# Patient Record
Sex: FEMALE | Race: WHITE | Employment: PART TIME | URBAN - METROPOLITAN AREA
[De-identification: names, ages, dates, MRNs, and addresses within clinical notes are randomized per-mention and may not be internally consistent; named-entity substitution may affect disease eponyms.]

---

## 2021-01-19 ENCOUNTER — OFFICE VISIT (OUTPATIENT)
Dept: FAMILY MEDICINE CLINIC | Facility: CLINIC | Age: 26
End: 2021-01-19
Payer: COMMERCIAL

## 2021-01-19 VITALS
HEART RATE: 111 BPM | OXYGEN SATURATION: 98 % | WEIGHT: 133 LBS | DIASTOLIC BLOOD PRESSURE: 70 MMHG | SYSTOLIC BLOOD PRESSURE: 108 MMHG | BODY MASS INDEX: 22.71 KG/M2 | HEIGHT: 64 IN

## 2021-01-19 DIAGNOSIS — Z00.00 ANNUAL PHYSICAL EXAM: Primary | ICD-10-CM

## 2021-01-19 DIAGNOSIS — Z00.00 PREVENTATIVE HEALTH CARE: ICD-10-CM

## 2021-01-19 DIAGNOSIS — L70.9 ACNE, UNSPECIFIED ACNE TYPE: ICD-10-CM

## 2021-01-19 DIAGNOSIS — Z30.09 BIRTH CONTROL COUNSELING: ICD-10-CM

## 2021-01-19 LAB
CONTROL LINE PRESENT WITH A CLEAR BACKGROUND (YES/NO): YES YES/NO
PREGNANCY TEST, URINE: NEGATIVE

## 2021-01-19 PROCEDURE — 81025 URINE PREGNANCY TEST: CPT | Performed by: INTERNAL MEDICINE

## 2021-01-19 PROCEDURE — 99385 PREV VISIT NEW AGE 18-39: CPT | Performed by: INTERNAL MEDICINE

## 2021-01-19 PROCEDURE — 36415 COLL VENOUS BLD VENIPUNCTURE: CPT | Performed by: INTERNAL MEDICINE

## 2021-01-19 PROCEDURE — 3078F DIAST BP <80 MM HG: CPT | Performed by: INTERNAL MEDICINE

## 2021-01-19 PROCEDURE — 3008F BODY MASS INDEX DOCD: CPT | Performed by: INTERNAL MEDICINE

## 2021-01-19 PROCEDURE — 99212 OFFICE O/P EST SF 10 MIN: CPT | Performed by: INTERNAL MEDICINE

## 2021-01-19 PROCEDURE — 3074F SYST BP LT 130 MM HG: CPT | Performed by: INTERNAL MEDICINE

## 2021-01-19 PROCEDURE — 90651 9VHPV VACCINE 2/3 DOSE IM: CPT | Performed by: INTERNAL MEDICINE

## 2021-01-19 PROCEDURE — 90471 IMMUNIZATION ADMIN: CPT | Performed by: INTERNAL MEDICINE

## 2021-01-19 RX ORDER — NORGESTIMATE AND ETHINYL ESTRADIOL 7DAYSX3 28
1 KIT ORAL DAILY
COMMUNITY
End: 2021-02-15

## 2021-01-19 RX ORDER — SPIRONOLACTONE 100 MG/1
100 TABLET, FILM COATED ORAL DAILY
Qty: 90 TABLET | Refills: 0 | Status: SHIPPED | OUTPATIENT
Start: 2021-01-19 | End: 2021-04-19

## 2021-01-19 RX ORDER — DROSPIRENONE AND ETHINYL ESTRADIOL 0.02-3(28)
1 KIT ORAL DAILY
Qty: 1 PACKAGE | Refills: 3 | Status: SHIPPED | OUTPATIENT
Start: 2021-01-19 | End: 2021-02-15 | Stop reason: HOSPADM

## 2021-01-19 RX ORDER — SPIRONOLACTONE 100 MG/1
TABLET, FILM COATED ORAL
COMMUNITY
Start: 2020-06-16 | End: 2021-01-19

## 2021-01-19 NOTE — PROGRESS NOTES
[facility]  New Patient History and Physical      HPI:   Patient presents with:  Medication Request  Contraception      Sari Valverde is a 22year old female presenting for: physical and birth control counseling.     Has  has no past Relation Age of Onset   • Psychiatric Father    • Psychiatric Mother    • Other (Other) Mother         fibro   • Psychiatric Sister    • Psychiatric Brother           REVIEW OF SYSTEMS:   Review of Systems   Constitutional: Negative for chills, fatigue, fe is oriented to person, place, and time. She appears well-developed and well-nourished. No distress. HENT:   Head: Normocephalic and atraumatic. Mouth/Throat: Oropharynx is clear and moist.   Eyes: Pupils are equal, round, and reactive to light.  Conjunc Health Maintenance:  Annual Physical 1/19/2022  Annual Depression Screen due on 08/17/2007  Pap Smear,3 Years due on 6/1/2021  Influenza Vaccine(1) due on 10/01/2020  HPV Vaccines(3 - 3-dose series) due on 10/16/2020  Pneumococcal Vaccine: as well as any side effects or complications from the treatments . Red flags/ ER precautions discussed.       Mer Harris MD  Internal Medicine/Primary Care  [place] [address]

## 2021-01-20 LAB
ABSOLUTE BASOPHILS: 32 CELLS/UL (ref 0–200)
ABSOLUTE EOSINOPHILS: 8 CELLS/UL (ref 15–500)
ABSOLUTE LYMPHOCYTES: 2520 CELLS/UL (ref 850–3900)
ABSOLUTE MONOCYTES: 482 CELLS/UL (ref 200–950)
ABSOLUTE NEUTROPHILS: 4859 CELLS/UL (ref 1500–7800)
ALBUMIN/GLOBULIN RATIO: 1.8 (CALC) (ref 1–2.5)
ALBUMIN: 4.7 G/DL (ref 3.6–5.1)
ALKALINE PHOSPHATASE: 63 U/L (ref 31–125)
ALT: 10 U/L (ref 6–29)
AST: 15 U/L (ref 10–30)
BASOPHILS: 0.4 %
BILIRUBIN, TOTAL: 0.4 MG/DL (ref 0.2–1.2)
BUN: 9 MG/DL (ref 7–25)
CALCIUM: 9.6 MG/DL (ref 8.6–10.2)
CARBON DIOXIDE: 22 MMOL/L (ref 20–32)
CHLORIDE: 105 MMOL/L (ref 98–110)
CREATININE: 0.8 MG/DL (ref 0.5–1.1)
EGFR IF AFRICN AM: 119 ML/MIN/1.73M2
EGFR IF NONAFRICN AM: 102 ML/MIN/1.73M2
EOSINOPHILS: 0.1 %
GLOBULIN: 2.6 G/DL (CALC) (ref 1.9–3.7)
GLUCOSE: 90 MG/DL (ref 65–99)
HEMATOCRIT: 40.2 % (ref 35–45)
HEMOGLOBIN: 13.7 G/DL (ref 11.7–15.5)
LYMPHOCYTES: 31.9 %
MCH: 30 PG (ref 27–33)
MCHC: 34.1 G/DL (ref 32–36)
MCV: 88.2 FL (ref 80–100)
MONOCYTES: 6.1 %
MPV: 10.7 FL (ref 7.5–12.5)
NEUTROPHILS: 61.5 %
PLATELET COUNT: 257 THOUSAND/UL (ref 140–400)
POTASSIUM: 4 MMOL/L (ref 3.5–5.3)
PROTEIN, TOTAL: 7.3 G/DL (ref 6.1–8.1)
RDW: 12.7 % (ref 11–15)
RED BLOOD CELL COUNT: 4.56 MILLION/UL (ref 3.8–5.1)
SODIUM: 137 MMOL/L (ref 135–146)
WHITE BLOOD CELL COUNT: 7.9 THOUSAND/UL (ref 3.8–10.8)

## 2021-01-22 ENCOUNTER — TELEPHONE (OUTPATIENT)
Dept: FAMILY MEDICINE CLINIC | Facility: CLINIC | Age: 26
End: 2021-01-22

## 2021-01-22 NOTE — TELEPHONE ENCOUNTER
Faxed over medical records request form to [address] [place] [address] Primary care  Confirmation received

## 2021-02-05 ENCOUNTER — PATIENT MESSAGE (OUTPATIENT)
Dept: FAMILY MEDICINE CLINIC | Facility: CLINIC | Age: 26
End: 2021-02-05

## 2021-02-15 RX ORDER — DROSPIRENONE AND ETHINYL ESTRADIOL 0.02-3(28)
1 KIT ORAL DAILY
Qty: 1 PACKAGE | Refills: 2 | Status: SHIPPED | OUTPATIENT
Start: 2021-02-15 | End: 2021-05-13

## 2021-05-13 RX ORDER — DROSPIRENONE AND ETHINYL ESTRADIOL TABLETS 0.02-3(28)
KIT ORAL
Qty: 28 TABLET | Refills: 0 | Status: SHIPPED | OUTPATIENT
Start: 2021-05-13 | End: 2021-06-21

## 2021-06-21 RX ORDER — DROSPIRENONE AND ETHINYL ESTRADIOL 0.02-3(28)
1 KIT ORAL DAILY
Qty: 28 TABLET | Refills: 0 | OUTPATIENT
Start: 2021-06-21

## 2021-06-21 RX ORDER — DROSPIRENONE AND ETHINYL ESTRADIOL 0.02-3(28)
KIT ORAL
Qty: 28 TABLET | Refills: 0 | Status: SHIPPED | OUTPATIENT
Start: 2021-06-21 | End: 2021-07-19

## 2021-06-21 NOTE — TELEPHONE ENCOUNTER
Narcias Johnston from pharmacy called stating that they never received the refill request for pts birthcontrol  If possible to re send it

## 2021-07-19 ENCOUNTER — OFFICE VISIT (OUTPATIENT)
Dept: FAMILY MEDICINE CLINIC | Facility: CLINIC | Age: 26
End: 2021-07-19
Payer: COMMERCIAL

## 2021-07-19 VITALS
DIASTOLIC BLOOD PRESSURE: 70 MMHG | OXYGEN SATURATION: 100 % | SYSTOLIC BLOOD PRESSURE: 110 MMHG | WEIGHT: 133 LBS | HEART RATE: 96 BPM | BODY MASS INDEX: 23 KG/M2

## 2021-07-19 DIAGNOSIS — F32.A DEPRESSION, UNSPECIFIED DEPRESSION TYPE: ICD-10-CM

## 2021-07-19 DIAGNOSIS — Z30.09 BIRTH CONTROL COUNSELING: Primary | ICD-10-CM

## 2021-07-19 PROCEDURE — 3078F DIAST BP <80 MM HG: CPT | Performed by: INTERNAL MEDICINE

## 2021-07-19 PROCEDURE — 99213 OFFICE O/P EST LOW 20 MIN: CPT | Performed by: INTERNAL MEDICINE

## 2021-07-19 PROCEDURE — 3074F SYST BP LT 130 MM HG: CPT | Performed by: INTERNAL MEDICINE

## 2021-07-19 RX ORDER — DROSPIRENONE AND ETHINYL ESTRADIOL 0.02-3(28)
KIT ORAL
Qty: 28 TABLET | Refills: 0 | OUTPATIENT
Start: 2021-07-19

## 2021-07-19 RX ORDER — ESCITALOPRAM OXALATE 10 MG/1
10 TABLET ORAL DAILY
Qty: 90 TABLET | Refills: 0 | Status: SHIPPED | OUTPATIENT
Start: 2021-07-19 | End: 2021-10-08

## 2021-07-19 RX ORDER — DROSPIRENONE AND ETHINYL ESTRADIOL 0.02-3(28)
1 KIT ORAL DAILY
Qty: 28 TABLET | Refills: 12 | Status: SHIPPED | OUTPATIENT
Start: 2021-07-19

## 2021-07-19 RX ORDER — CLINDAMYCIN AND BENZOYL PEROXIDE 10; 50 MG/G; MG/G
GEL TOPICAL 2 TIMES DAILY
COMMUNITY

## 2021-07-19 RX ORDER — SPIRONOLACTONE 100 MG/1
TABLET, FILM COATED ORAL
COMMUNITY
Start: 2021-06-26 | End: 2021-07-27

## 2021-07-21 NOTE — PROGRESS NOTES
[facility]  Return Patient      HPI:   Patient presents with:  Contraception  Anxiety      Mekhi Flowers is a 22year old female presenting for: physical and birth control counseling. Has  has no past medical history on file. 6.1 - 8.1 g/dL    ALBUMIN 4.7 3.6 - 5.1 g/dL    GLOBULIN 2.6 1.9 - 3.7 g/dL (calc)    ALBUMIN/GLOBULIN RATIO 1.8 1.0 - 2.5 (calc)    BILIRUBIN, TOTAL 0.4 0.2 - 1.2 mg/dL    ALKALINE PHOSPHATASE 63 31 - 125 U/L    AST 15 10 - 30 U/L    ALT 10 6 - 29 U/L REVIEW OF SYSTEMS:   Review of Systems   Constitutional: Negative for chills, fatigue, fever and unexpected weight change. HENT: Negative for congestion, ear pain, hearing loss, rhinorrhea and sore throat.     Eyes: Negative for pain, redness and vi Mouth/Throat: Oropharynx is clear and moist.   Eyes: Pupils are equal, round, and reactive to light. Conjunctivae and EOM are normal.   Neck: No thyromegaly present.    Cardiovascular: Normal rate, regular rhythm, S1 normal, S2 normal, normal heart sounds

## 2021-07-27 RX ORDER — SPIRONOLACTONE 100 MG/1
100 TABLET, FILM COATED ORAL DAILY
Qty: 90 TABLET | Refills: 0 | Status: SHIPPED | OUTPATIENT
Start: 2021-07-27 | End: 2021-12-07

## 2021-09-07 ENCOUNTER — PATIENT MESSAGE (OUTPATIENT)
Dept: FAMILY MEDICINE CLINIC | Facility: CLINIC | Age: 26
End: 2021-09-07

## 2021-09-08 NOTE — TELEPHONE ENCOUNTER
Pt called in regards previous message   Pt has a really sharp pain, headaches are bad and medication is not helping  Pt would like to be seen if possible   Please call back and advise

## 2021-09-08 NOTE — TELEPHONE ENCOUNTER
Left voicemail. Offer an appt with Dr. Mague Ortega tomorrow, 9/9. Otherwise, [facility] or  for immediate evaluation.

## 2021-09-20 ENCOUNTER — OFFICE VISIT (OUTPATIENT)
Dept: FAMILY MEDICINE CLINIC | Facility: CLINIC | Age: 26
End: 2021-09-20
Payer: COMMERCIAL

## 2021-09-20 VITALS
OXYGEN SATURATION: 99 % | HEART RATE: 91 BPM | SYSTOLIC BLOOD PRESSURE: 104 MMHG | HEIGHT: 64 IN | WEIGHT: 136 LBS | TEMPERATURE: 98 F | DIASTOLIC BLOOD PRESSURE: 70 MMHG | BODY MASS INDEX: 23.22 KG/M2

## 2021-09-20 DIAGNOSIS — Z11.3 SCREEN FOR STD (SEXUALLY TRANSMITTED DISEASE): ICD-10-CM

## 2021-09-20 DIAGNOSIS — N83.209 CYST OF OVARY, UNSPECIFIED LATERALITY: ICD-10-CM

## 2021-09-20 DIAGNOSIS — Z12.4 PAP SMEAR FOR CERVICAL CANCER SCREENING: Primary | ICD-10-CM

## 2021-09-20 PROCEDURE — 3078F DIAST BP <80 MM HG: CPT | Performed by: INTERNAL MEDICINE

## 2021-09-20 PROCEDURE — 3008F BODY MASS INDEX DOCD: CPT | Performed by: INTERNAL MEDICINE

## 2021-09-20 PROCEDURE — 99214 OFFICE O/P EST MOD 30 MIN: CPT | Performed by: INTERNAL MEDICINE

## 2021-09-20 PROCEDURE — 3074F SYST BP LT 130 MM HG: CPT | Performed by: INTERNAL MEDICINE

## 2021-09-20 RX ORDER — NAPROXEN 500 MG/1
500 TABLET ORAL 2 TIMES DAILY WITH MEALS
Qty: 60 TABLET | Refills: 0 | Status: SHIPPED | OUTPATIENT
Start: 2021-09-20

## 2021-09-20 RX ORDER — SUMATRIPTAN 50 MG/1
50 TABLET, FILM COATED ORAL EVERY 2 HOUR PRN
Qty: 10 TABLET | Refills: 0 | Status: SHIPPED | OUTPATIENT
Start: 2021-09-20

## 2021-09-23 LAB
CHLAMYDIA TRACHOMATIS$RNA, TMA: NOT DETECTED
HPV MRNA E6/E7: NOT DETECTED
NEISSERIA GONORRHOEAE$RNA, TMA: NOT DETECTED
TRICHOMONAS VAGINALIS, QL TMA, PAP VIAL: NOT DETECTED

## 2021-09-23 NOTE — PROGRESS NOTES
[facility]  Return Patient      HPI:   Patient presents with:  Pap  Medication Request: for depression      Vita Samayoa is a 32year old female presenting for: physical and birth control counseling.     Has  has no past medical h mg/dL    PROTEIN, TOTAL 7.3 6.1 - 8.1 g/dL    ALBUMIN 4.7 3.6 - 5.1 g/dL    GLOBULIN 2.6 1.9 - 3.7 g/dL (calc)    ALBUMIN/GLOBULIN RATIO 1.8 1.0 - 2.5 (calc)    BILIRUBIN, TOTAL 0.4 0.2 - 1.2 mg/dL    ALKALINE PHOSPHATASE 63 31 - 125 U/L    AST 15 10 - 30 Use      Smoking status: Never Smoker      Smokeless tobacco: Never Used    Alcohol use: Yes    Drug use: Never       Family History:  Family History   Problem Relation Age of Onset   • Psychiatric Father    • Psychiatric Mother    • Other (Other) Mother kg).   Wt Readings from Last 3 Encounters:  09/20/21 : 136 lb (61.7 kg)  07/19/21 : 133 lb (60.3 kg)  01/19/21 : 133 lb (60.3 kg)      Physical Exam   Vitals reviewed. Constitutional: She is oriented to person, place, and time.  She appears well-developed Health Maintenance:  Annual Physical 1/19/2022  Annual Depression Screen due on 08/17/2007  Pap Smear,3 Years due on 6/1/2021  Influenza Vaccine(1) due on 10/01/2020  HPV Vaccines(3 - 3-dose series) due on 10/16/2020  Pneumococcal Vaccine: Birth to

## 2021-09-27 ENCOUNTER — TELEPHONE (OUTPATIENT)
Dept: FAMILY MEDICINE CLINIC | Facility: CLINIC | Age: 26
End: 2021-09-27

## 2021-09-27 ENCOUNTER — OFFICE VISIT (OUTPATIENT)
Dept: FAMILY MEDICINE CLINIC | Facility: CLINIC | Age: 26
End: 2021-09-27
Payer: COMMERCIAL

## 2021-09-27 VITALS
WEIGHT: 136 LBS | HEART RATE: 87 BPM | TEMPERATURE: 98 F | HEIGHT: 65 IN | DIASTOLIC BLOOD PRESSURE: 91 MMHG | RESPIRATION RATE: 15 BRPM | SYSTOLIC BLOOD PRESSURE: 127 MMHG | BODY MASS INDEX: 22.66 KG/M2 | OXYGEN SATURATION: 97 %

## 2021-09-27 DIAGNOSIS — H57.89 IRRITATION OF BOTH EYES: Primary | ICD-10-CM

## 2021-09-27 PROCEDURE — 3008F BODY MASS INDEX DOCD: CPT | Performed by: NURSE PRACTITIONER

## 2021-09-27 PROCEDURE — 99213 OFFICE O/P EST LOW 20 MIN: CPT | Performed by: NURSE PRACTITIONER

## 2021-09-27 RX ORDER — TOBRAMYCIN 3 MG/ML
SOLUTION/ DROPS OPHTHALMIC
Qty: 1 EACH | Refills: 0 | Status: SHIPPED | OUTPATIENT
Start: 2021-09-27

## 2021-09-27 NOTE — PROGRESS NOTES
CHIEF COMPLAINT:   Patient presents with:  Eye Problem      HPI:   Trena Freeman is a 32year old female who presents with chief complaint of possible pink eye/eye complaint. Symptoms began within the past 24 hours.   Symptoms have been gradually worseni Smoking status: Never Smoker      Smokeless tobacco: Never Used    Alcohol use: Yes    Drug use: Never        REVIEW OF SYSTEMS:   GENERAL: feels well otherwise  SKIN: no rashes  EYES:denies blurred vision or double vision.  See HPI  HENT: denies ear pain, plan.         Requested Prescriptions     Signed Prescriptions Disp Refills   • Tobramycin Sulfate 0.3 % Ophthalmic Solution 1 each 0     Sig: Instill 1-2 gtt in both eye(s) q4h for 5-7 days         Risks, benefits, complications and side effects of meds d

## 2021-09-27 NOTE — TELEPHONE ENCOUNTER
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 Today woke up with green discharge with crusting. Dr. Adolfo Mas fully booked today, offered an appt tomorrow, but won't be until late afternoon. Pt unlikely to make it tomorrow as she works.   Discusse

## 2021-09-27 NOTE — PATIENT INSTRUCTIONS
Conjunctivitis, Allergic    Conjunctivitis is an irritation of the thin membrane covering the eye and the inside of the eyelid. This membrane is called the conjunctiva. The condition is often called pink eye or red eye because the eye looks pink or red. medical care right away if any of these occur:   · Increased eyelid swelling  · New or worsening drainage from the eye  · Increasing redness around the eye  · Facial swelling  Cori last reviewed this educational content on 4/1/2020  © 5648-4014 [facility]

## 2021-09-27 NOTE — TELEPHONE ENCOUNTER
Patient states she woke up with green discharge on both eyes. They are extremely itchy, puffy. No fevers. Patient wants to know if she can be seen today or if there is something doctor recommend for her to do. Please advice.

## 2021-10-08 RX ORDER — ESCITALOPRAM OXALATE 10 MG/1
TABLET ORAL
Qty: 90 TABLET | Refills: 0 | Status: SHIPPED | OUTPATIENT
Start: 2021-10-08 | End: 2021-12-28

## 2021-12-07 RX ORDER — SPIRONOLACTONE 100 MG/1
TABLET, FILM COATED ORAL
Qty: 90 TABLET | Refills: 0 | Status: SHIPPED | OUTPATIENT
Start: 2021-12-07

## 2021-12-28 RX ORDER — ESCITALOPRAM OXALATE 10 MG/1
TABLET ORAL
Qty: 90 TABLET | Refills: 0 | Status: SHIPPED | OUTPATIENT
Start: 2021-12-28

## 2022-03-22 RX ORDER — ESCITALOPRAM OXALATE 10 MG/1
TABLET ORAL
Qty: 90 TABLET | Refills: 0 | OUTPATIENT
Start: 2022-03-22

## 2022-03-24 ENCOUNTER — OFFICE VISIT (OUTPATIENT)
Dept: FAMILY MEDICINE CLINIC | Facility: CLINIC | Age: 27
End: 2022-03-24
Payer: COMMERCIAL

## 2022-03-24 VITALS
DIASTOLIC BLOOD PRESSURE: 64 MMHG | OXYGEN SATURATION: 98 % | HEART RATE: 94 BPM | BODY MASS INDEX: 24 KG/M2 | SYSTOLIC BLOOD PRESSURE: 106 MMHG | WEIGHT: 145 LBS | TEMPERATURE: 98 F

## 2022-03-24 DIAGNOSIS — Z00.00 PREVENTATIVE HEALTH CARE: ICD-10-CM

## 2022-03-24 DIAGNOSIS — R63.5 WEIGHT GAIN: ICD-10-CM

## 2022-03-24 DIAGNOSIS — F32.A DEPRESSION, UNSPECIFIED DEPRESSION TYPE: Primary | ICD-10-CM

## 2022-03-24 PROCEDURE — 3078F DIAST BP <80 MM HG: CPT | Performed by: INTERNAL MEDICINE

## 2022-03-24 PROCEDURE — 99214 OFFICE O/P EST MOD 30 MIN: CPT | Performed by: INTERNAL MEDICINE

## 2022-03-24 PROCEDURE — 3074F SYST BP LT 130 MM HG: CPT | Performed by: INTERNAL MEDICINE

## 2022-03-24 RX ORDER — ESCITALOPRAM OXALATE 20 MG/1
20 TABLET ORAL DAILY
Qty: 90 TABLET | Refills: 0 | Status: SHIPPED | OUTPATIENT
Start: 2022-03-24

## 2022-03-25 LAB
ALBUMIN/GLOBULIN RATIO: 1.6 (CALC) (ref 1–2.5)
ALBUMIN: 4.3 G/DL (ref 3.6–5.1)
ALKALINE PHOSPHATASE: 55 U/L (ref 31–125)
AST: 12 U/L (ref 10–30)
BILIRUBIN, TOTAL: 0.2 MG/DL (ref 0.2–1.2)
BUN: 10 MG/DL (ref 7–25)
CALCIUM: 9.7 MG/DL (ref 8.6–10.2)
CARBON DIOXIDE: 20 MMOL/L (ref 20–32)
CHLORIDE: 108 MMOL/L (ref 98–110)
CREATININE: 0.88 MG/DL (ref 0.5–1.1)
EGFR IF AFRICN AM: 105 ML/MIN/1.73M2
EGFR IF NONAFRICN AM: 91 ML/MIN/1.73M2
GLOBULIN: 2.7 G/DL (CALC) (ref 1.9–3.7)
GLUCOSE: 90 MG/DL (ref 65–99)
HEMATOCRIT: 39.7 % (ref 35–45)
HEMOGLOBIN: 13.8 G/DL (ref 11.7–15.5)
MCH: 30.5 PG (ref 27–33)
MCHC: 34.8 G/DL (ref 32–36)
MCV: 87.6 FL (ref 80–100)
MPV: 10.9 FL (ref 7.5–12.5)
PLATELET COUNT: 261 THOUSAND/UL (ref 140–400)
POTASSIUM: 4.8 MMOL/L (ref 3.5–5.3)
PROTEIN, TOTAL: 7 G/DL (ref 6.1–8.1)
RDW: 12.7 % (ref 11–15)
RED BLOOD CELL COUNT: 4.53 MILLION/UL (ref 3.8–5.1)
SODIUM: 143 MMOL/L (ref 135–146)
TSH W/REFLEX TO FT4: 2.84 MIU/L
WHITE BLOOD CELL COUNT: 5.6 THOUSAND/UL (ref 3.8–10.8)

## 2022-04-12 RX ORDER — SPIRONOLACTONE 100 MG/1
TABLET, FILM COATED ORAL
Qty: 90 TABLET | Refills: 0 | Status: SHIPPED | OUTPATIENT
Start: 2022-04-12

## 2022-04-29 ENCOUNTER — HOSPITAL ENCOUNTER (OUTPATIENT)
Dept: ULTRASOUND IMAGING | Age: 27
Discharge: HOME | End: 2022-04-29
Attending: INTERNAL MEDICINE
Payer: COMMERCIAL

## 2022-04-29 DIAGNOSIS — N83.209 CYST OF OVARY, UNSPECIFIED LATERALITY: ICD-10-CM

## 2022-04-29 PROCEDURE — 76830 TRANSVAGINAL US NON-OB: CPT | Performed by: INTERNAL MEDICINE

## 2022-04-29 PROCEDURE — 76856 US EXAM PELVIC COMPLETE: CPT | Performed by: INTERNAL MEDICINE

## 2022-05-02 RX ORDER — ESCITALOPRAM OXALATE 20 MG/1
20 TABLET ORAL DAILY
Qty: 90 TABLET | Refills: 0 | OUTPATIENT
Start: 2022-05-02

## 2022-06-09 ENCOUNTER — TELEPHONE (OUTPATIENT)
Dept: INTERNAL MEDICINE CLINIC | Facility: CLINIC | Age: 27
End: 2022-06-09

## 2022-06-09 ENCOUNTER — HOSPITAL ENCOUNTER (EMERGENCY)
Facility: HOSPITAL | Age: 27
Discharge: HOME | End: 2022-06-09
Attending: EMERGENCY MEDICINE
Payer: COMMERCIAL

## 2022-06-09 ENCOUNTER — HOSPITAL ENCOUNTER (OUTPATIENT)
Age: 27
Discharge: ANOTHER HOSPITAL | End: 2022-06-09
Payer: COMMERCIAL

## 2022-06-09 ENCOUNTER — APPOINTMENT (OUTPATIENT)
Dept: GENERAL RADIOLOGY | Facility: HOSPITAL | Age: 27
End: 2022-06-09
Attending: EMERGENCY MEDICINE
Payer: COMMERCIAL

## 2022-06-09 VITALS
DIASTOLIC BLOOD PRESSURE: 72 MMHG | BODY MASS INDEX: 22.49 KG/M2 | HEART RATE: 109 BPM | OXYGEN SATURATION: 100 % | TEMPERATURE: 100 F | HEIGHT: 65 IN | RESPIRATION RATE: 16 BRPM | WEIGHT: 135 LBS | SYSTOLIC BLOOD PRESSURE: 131 MMHG

## 2022-06-09 VITALS
RESPIRATION RATE: 18 BRPM | TEMPERATURE: 100 F | BODY MASS INDEX: 22.49 KG/M2 | HEART RATE: 112 BPM | SYSTOLIC BLOOD PRESSURE: 123 MMHG | WEIGHT: 135 LBS | HEIGHT: 65 IN | OXYGEN SATURATION: 100 % | DIASTOLIC BLOOD PRESSURE: 70 MMHG

## 2022-06-09 DIAGNOSIS — R51.9 NEW ONSET HEADACHE: ICD-10-CM

## 2022-06-09 DIAGNOSIS — U07.1 COVID-19: ICD-10-CM

## 2022-06-09 DIAGNOSIS — H53.9 VISUAL DISTURBANCE: ICD-10-CM

## 2022-06-09 DIAGNOSIS — U07.1 COVID-19: Primary | ICD-10-CM

## 2022-06-09 DIAGNOSIS — R41.82 ALTERED MENTAL STATUS, UNSPECIFIED ALTERED MENTAL STATUS TYPE: Primary | ICD-10-CM

## 2022-06-09 LAB — SARS-COV-2 RNA RESP QL NAA+PROBE: DETECTED

## 2022-06-09 PROCEDURE — 99284 EMERGENCY DEPT VISIT MOD MDM: CPT

## 2022-06-09 PROCEDURE — 99215 OFFICE O/P EST HI 40 MIN: CPT | Performed by: NURSE PRACTITIONER

## 2022-06-09 PROCEDURE — 96374 THER/PROPH/DIAG INJ IV PUSH: CPT

## 2022-06-09 PROCEDURE — U0002 COVID-19 LAB TEST NON-CDC: HCPCS | Performed by: NURSE PRACTITIONER

## 2022-06-09 PROCEDURE — 96361 HYDRATE IV INFUSION ADD-ON: CPT

## 2022-06-09 PROCEDURE — 96375 TX/PRO/DX INJ NEW DRUG ADDON: CPT

## 2022-06-09 PROCEDURE — 71045 X-RAY EXAM CHEST 1 VIEW: CPT | Performed by: EMERGENCY MEDICINE

## 2022-06-09 RX ORDER — DEXAMETHASONE SODIUM PHOSPHATE 4 MG/ML
4 VIAL (ML) INJECTION ONCE
Status: COMPLETED | OUTPATIENT
Start: 2022-06-09 | End: 2022-06-09

## 2022-06-09 RX ORDER — KETOROLAC TROMETHAMINE 15 MG/ML
15 INJECTION, SOLUTION INTRAMUSCULAR; INTRAVENOUS ONCE
Status: COMPLETED | OUTPATIENT
Start: 2022-06-09 | End: 2022-06-09

## 2022-06-09 RX ORDER — KETOROLAC TROMETHAMINE 10 MG/1
10 TABLET, FILM COATED ORAL EVERY 6 HOURS PRN
Qty: 15 TABLET | Refills: 0 | Status: SHIPPED | OUTPATIENT
Start: 2022-06-09 | End: 2022-06-16
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
## 2022-06-10 NOTE — TELEPHONE ENCOUNTER
Left vm. Please contact pt and assist in scheduling for a video (MyChart) visit at 10:40am with Dr. Jay Moon. Recently COVID+ so it has to be telemedicine.

## 2022-06-13 ENCOUNTER — TELEMEDICINE (OUTPATIENT)
Dept: INTERNAL MEDICINE CLINIC | Facility: CLINIC | Age: 27
End: 2022-06-13

## 2022-06-13 DIAGNOSIS — U07.1 COVID: ICD-10-CM

## 2022-06-13 DIAGNOSIS — R56.9 SEIZURE (HCC): Primary | ICD-10-CM

## 2022-06-13 PROCEDURE — 99214 OFFICE O/P EST MOD 30 MIN: CPT | Performed by: INTERNAL MEDICINE

## 2022-07-13 RX ORDER — DROSPIRENONE AND ETHINYL ESTRADIOL 0.02-3(28)
1 KIT ORAL DAILY
Qty: 28 TABLET | Refills: 0 | Status: SHIPPED | OUTPATIENT
Start: 2022-07-13

## 2022-07-26 RX ORDER — SPIRONOLACTONE 100 MG/1
TABLET, FILM COATED ORAL
Qty: 90 TABLET | Refills: 0 | Status: SHIPPED | OUTPATIENT
Start: 2022-07-26

## 2022-08-08 ENCOUNTER — TELEPHONE (OUTPATIENT)
Dept: INTERNAL MEDICINE CLINIC | Facility: CLINIC | Age: 27
End: 2022-08-08

## 2022-08-08 ENCOUNTER — OFFICE VISIT (OUTPATIENT)
Dept: INTERNAL MEDICINE CLINIC | Facility: CLINIC | Age: 27
End: 2022-08-08
Payer: COMMERCIAL

## 2022-08-08 VITALS
HEART RATE: 72 BPM | WEIGHT: 135 LBS | BODY MASS INDEX: 22.49 KG/M2 | HEIGHT: 65 IN | SYSTOLIC BLOOD PRESSURE: 124 MMHG | RESPIRATION RATE: 16 BRPM | DIASTOLIC BLOOD PRESSURE: 82 MMHG

## 2022-08-08 DIAGNOSIS — R42 VERTIGO: Primary | ICD-10-CM

## 2022-08-08 PROCEDURE — 3079F DIAST BP 80-89 MM HG: CPT | Performed by: INTERNAL MEDICINE

## 2022-08-08 PROCEDURE — 99214 OFFICE O/P EST MOD 30 MIN: CPT | Performed by: INTERNAL MEDICINE

## 2022-08-08 PROCEDURE — 3008F BODY MASS INDEX DOCD: CPT | Performed by: INTERNAL MEDICINE

## 2022-08-08 PROCEDURE — 3074F SYST BP LT 130 MM HG: CPT | Performed by: INTERNAL MEDICINE

## 2022-08-08 RX ORDER — MECLIZINE HYDROCHLORIDE 50 MG/1
50 TABLET ORAL 3 TIMES DAILY PRN
Qty: 90 TABLET | Refills: 0 | Status: SHIPPED | OUTPATIENT
Start: 2022-08-08
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
## 2022-08-08 NOTE — TELEPHONE ENCOUNTER
Patient is calling in stating she has been experiencing dizziness since yesterday. States she feels intoxicated but has not had any alcohol in over a week. States she is concerned, please call to advise.

## 2022-08-09 ENCOUNTER — TELEPHONE (OUTPATIENT)
Dept: INTERNAL MEDICINE CLINIC | Facility: CLINIC | Age: 27
End: 2022-08-09

## 2022-08-09 NOTE — TELEPHONE ENCOUNTER
Pt called stating that the depression medication that she is currently on is not helping and would like for doctor to change it if possible     Please call back and advise

## 2022-08-16 ENCOUNTER — OFFICE VISIT (OUTPATIENT)
Dept: INTERNAL MEDICINE CLINIC | Facility: CLINIC | Age: 27
End: 2022-08-16
Payer: COMMERCIAL

## 2022-08-16 VITALS
HEART RATE: 85 BPM | SYSTOLIC BLOOD PRESSURE: 96 MMHG | WEIGHT: 134 LBS | BODY MASS INDEX: 22.88 KG/M2 | OXYGEN SATURATION: 99 % | DIASTOLIC BLOOD PRESSURE: 62 MMHG | TEMPERATURE: 97 F | HEIGHT: 64 IN

## 2022-08-16 DIAGNOSIS — F32.A DEPRESSION, UNSPECIFIED DEPRESSION TYPE: Primary | ICD-10-CM

## 2022-08-16 PROCEDURE — 3074F SYST BP LT 130 MM HG: CPT | Performed by: INTERNAL MEDICINE

## 2022-08-16 PROCEDURE — 3078F DIAST BP <80 MM HG: CPT | Performed by: INTERNAL MEDICINE

## 2022-08-16 PROCEDURE — 3008F BODY MASS INDEX DOCD: CPT | Performed by: INTERNAL MEDICINE

## 2022-08-16 PROCEDURE — 99214 OFFICE O/P EST MOD 30 MIN: CPT | Performed by: INTERNAL MEDICINE

## 2022-08-16 RX ORDER — SERTRALINE HYDROCHLORIDE 25 MG/1
25 TABLET, FILM COATED ORAL DAILY
Qty: 30 TABLET | Refills: 1 | Status: SHIPPED | OUTPATIENT
Start: 2022-08-16

## 2022-08-18 ENCOUNTER — TELEPHONE (OUTPATIENT)
Dept: NEUROLOGY | Facility: CLINIC | Age: 27
End: 2022-08-18

## 2022-08-18 ENCOUNTER — OFFICE VISIT (OUTPATIENT)
Dept: NEUROLOGY | Facility: CLINIC | Age: 27
End: 2022-08-18
Payer: COMMERCIAL

## 2022-08-18 VITALS
HEART RATE: 70 BPM | BODY MASS INDEX: 22.88 KG/M2 | DIASTOLIC BLOOD PRESSURE: 82 MMHG | WEIGHT: 134 LBS | SYSTOLIC BLOOD PRESSURE: 124 MMHG | HEIGHT: 64 IN

## 2022-08-18 DIAGNOSIS — G40.909 NONINTRACTABLE EPILEPSY WITHOUT STATUS EPILEPTICUS, UNSPECIFIED EPILEPSY TYPE (HCC): Primary | ICD-10-CM

## 2022-08-18 PROCEDURE — 3079F DIAST BP 80-89 MM HG: CPT | Performed by: OTHER

## 2022-08-18 PROCEDURE — 3074F SYST BP LT 130 MM HG: CPT | Performed by: OTHER

## 2022-08-18 PROCEDURE — 3008F BODY MASS INDEX DOCD: CPT | Performed by: OTHER

## 2022-08-18 PROCEDURE — 99205 OFFICE O/P NEW HI 60 MIN: CPT | Performed by: OTHER

## 2022-08-18 RX ORDER — FOLIC ACID 1 MG/1
TABLET ORAL
Qty: 141 TABLET | Refills: 0 | Status: SHIPPED | OUTPATIENT
Start: 2022-08-18 | End: 2022-12-23

## 2022-08-18 RX ORDER — LEVETIRACETAM 500 MG/1
500 TABLET ORAL 2 TIMES DAILY
Qty: 180 TABLET | Refills: 1 | Status: SHIPPED | OUTPATIENT
Start: 2022-08-18 | End: 2023-02-14

## 2022-08-18 RX ORDER — TOPIRAMATE 50 MG/1
50 TABLET, FILM COATED ORAL 2 TIMES DAILY
Qty: 180 TABLET | Refills: 1 | Status: SHIPPED | OUTPATIENT
Start: 2022-08-18 | End: 2023-02-14

## 2022-08-18 NOTE — TELEPHONE ENCOUNTER
Patient called and said she received (2) prescriptions from her Pharmacy. One is Keppra and one is Topiramate. She wants to make sure she is supposed to   be taking both.

## 2022-08-18 NOTE — TELEPHONE ENCOUNTER
He needs to take Keppra not Topamax. I discontinued the Topamax prescription. I also explain to her that she needs to take keppra not Topamax. I will call her and let her know as well.

## 2022-08-23 ENCOUNTER — TELEPHONE (OUTPATIENT)
Dept: INTERNAL MEDICINE CLINIC | Facility: CLINIC | Age: 27
End: 2022-08-23

## 2022-08-23 RX ORDER — DROSPIRENONE AND ETHINYL ESTRADIOL 0.02-3(28)
1 KIT ORAL DAILY
Qty: 28 TABLET | Refills: 2 | Status: SHIPPED | OUTPATIENT
Start: 2022-08-23 | End: 2022-08-23

## 2022-08-23 RX ORDER — DROSPIRENONE AND ETHINYL ESTRADIOL 0.02-3(28)
1 KIT ORAL DAILY
Qty: 28 TABLET | Refills: 2 | Status: SHIPPED | OUTPATIENT
Start: 2022-08-23

## 2022-08-23 NOTE — TELEPHONE ENCOUNTER
Birthcontrol was authorized to [facility]. Lexapro is what she wants to go back to. Patient is not using sertraline because of possible side effects and prefers to use Lexapro.

## 2022-08-23 NOTE — TELEPHONE ENCOUNTER
Pt is calling stating that pcp gave her new medication but is not taking it because of the side effects and would like to get the previous medication instead. Pt is also calling requesting birthcontrol.        Please call and advise

## 2022-08-24 RX ORDER — ESCITALOPRAM OXALATE 20 MG/1
20 TABLET ORAL DAILY
Qty: 30 TABLET | Refills: 0 | Status: SHIPPED | OUTPATIENT
Start: 2022-08-24

## 2022-09-27 ENCOUNTER — PATIENT MESSAGE (OUTPATIENT)
Dept: NEUROLOGY | Facility: CLINIC | Age: 27
End: 2022-09-27

## 2022-09-27 ENCOUNTER — TELEPHONE (OUTPATIENT)
Dept: INTERNAL MEDICINE CLINIC | Facility: CLINIC | Age: 27
End: 2022-09-27

## 2022-09-27 RX ORDER — ESCITALOPRAM OXALATE 20 MG/1
20 TABLET ORAL DAILY
Qty: 90 TABLET | Refills: 0 | Status: SHIPPED | OUTPATIENT
Start: 2022-09-27

## 2022-09-27 RX ORDER — ESCITALOPRAM OXALATE 20 MG/1
TABLET ORAL
Qty: 30 TABLET | Refills: 0 | OUTPATIENT
Start: 2022-09-27

## 2022-09-30 RX ORDER — ESCITALOPRAM OXALATE 20 MG/1
20 TABLET ORAL DAILY
Qty: 90 TABLET | Refills: 0 | OUTPATIENT
Start: 2022-09-30

## 2022-10-30 ENCOUNTER — OFFICE VISIT (OUTPATIENT)
Dept: FAMILY MEDICINE CLINIC | Facility: CLINIC | Age: 27
End: 2022-10-30
Payer: COMMERCIAL

## 2022-10-30 VITALS
BODY MASS INDEX: 22.49 KG/M2 | HEIGHT: 65 IN | WEIGHT: 135 LBS | SYSTOLIC BLOOD PRESSURE: 124 MMHG | HEART RATE: 91 BPM | DIASTOLIC BLOOD PRESSURE: 78 MMHG | RESPIRATION RATE: 16 BRPM | OXYGEN SATURATION: 98 % | TEMPERATURE: 99 F

## 2022-10-30 DIAGNOSIS — J02.0 STREP THROAT: Primary | ICD-10-CM

## 2022-10-30 LAB
CONTROL LINE PRESENT WITH A CLEAR BACKGROUND (YES/NO): YES YES/NO
KIT LOT #: ABNORMAL NUMERIC
STREP GRP A CUL-SCR: POSITIVE

## 2022-10-30 PROCEDURE — 3008F BODY MASS INDEX DOCD: CPT | Performed by: NURSE PRACTITIONER

## 2022-10-30 PROCEDURE — 87880 STREP A ASSAY W/OPTIC: CPT | Performed by: NURSE PRACTITIONER

## 2022-10-30 PROCEDURE — 3078F DIAST BP <80 MM HG: CPT | Performed by: NURSE PRACTITIONER

## 2022-10-30 PROCEDURE — 99213 OFFICE O/P EST LOW 20 MIN: CPT | Performed by: NURSE PRACTITIONER

## 2022-10-30 PROCEDURE — 3074F SYST BP LT 130 MM HG: CPT | Performed by: NURSE PRACTITIONER

## 2022-10-30 RX ORDER — AMOXICILLIN 500 MG/1
500 CAPSULE ORAL 3 TIMES DAILY
Qty: 30 CAPSULE | Refills: 0 | Status: SHIPPED | OUTPATIENT
Start: 2022-10-30 | End: 2022-11-09

## 2022-10-30 NOTE — PATIENT INSTRUCTIONS
You are considered to be contagious until you have been on antibiotics for 24 hours. You can return to school and/or work once on antibiotics for 24 hours. Can use over the counter Tylenol/Ibuprofen as needed. Push fluids- warm or cool liquids, whichever is soothing for patient  Change tooth brush two days into antibiotic therapy. Warm salt water gargles 2 times per day for at least 3 days. Do not share utensils or drinks with anyone. Follow up in 3-5 days if not improving, condition worsens, or fever greater than or equal to 100.4 persists for 72 hours.     Be sure to finish entire course of antibiotics to reduce risk of reinfection or antibiotic resistance

## 2022-11-01 RX ORDER — DROSPIRENONE AND ETHINYL ESTRADIOL 0.02-3(28)
1 KIT ORAL DAILY
Qty: 28 TABLET | Refills: 2 | Status: SHIPPED | OUTPATIENT
Start: 2022-11-01

## 2022-11-11 RX ORDER — SPIRONOLACTONE 100 MG/1
TABLET, FILM COATED ORAL
Qty: 90 TABLET | Refills: 0 | Status: SHIPPED | OUTPATIENT
Start: 2022-11-11

## 2022-11-17 ENCOUNTER — TELEPHONE (OUTPATIENT)
Dept: INTERNAL MEDICINE CLINIC | Facility: CLINIC | Age: 27
End: 2022-11-17

## 2022-11-17 NOTE — TELEPHONE ENCOUNTER
Patient only describes discomfort from the strep, she did finish the 10 days of antibiotics. She was advised to do home supportive care and to use OTC meds to help with the pain, she verbalized understanding.

## 2022-11-17 NOTE — TELEPHONE ENCOUNTER
Patient is calling stating she was positive with covid on 10/30. Was given an antibiotic for 10 days. She finished the antibiotic about a week ago. She states still feels a moderate sore throat and wants to know if that's normal and wonder if she can still be contagious. 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       Please advice

## 2022-11-25 DIAGNOSIS — G40.909 NONINTRACTABLE EPILEPSY WITHOUT STATUS EPILEPTICUS, UNSPECIFIED EPILEPSY TYPE (HCC): ICD-10-CM

## 2022-11-28 RX ORDER — FOLIC ACID 1 MG/1
TABLET ORAL
Qty: 141 TABLET | Refills: 0 | Status: SHIPPED | OUTPATIENT
Start: 2022-11-28

## 2022-11-28 NOTE — TELEPHONE ENCOUNTER
The patient is requesting a refill on: Folic acid - Take one tablet daily    Last OV: 8/18/22  Next OV: None scheduled  Last refilled: 8/18/22 #141

## 2022-12-29 RX ORDER — ESCITALOPRAM OXALATE 20 MG/1
TABLET ORAL
Qty: 90 TABLET | Refills: 0 | Status: SHIPPED | OUTPATIENT
Start: 2022-12-29

## 2023-01-03 ENCOUNTER — HOSPITAL ENCOUNTER (INPATIENT)
Facility: HOSPITAL | Age: 28
LOS: 1 days | Discharge: HOME | End: 2023-01-04
Attending: EMERGENCY MEDICINE | Admitting: HOSPITALIST
Payer: COMMERCIAL

## 2023-01-03 ENCOUNTER — APPOINTMENT (OUTPATIENT)
Dept: CT IMAGING | Facility: HOSPITAL | Age: 28
End: 2023-01-03
Attending: EMERGENCY MEDICINE
Payer: COMMERCIAL

## 2023-01-03 DIAGNOSIS — R56.9 SEIZURE (HCC): Primary | ICD-10-CM

## 2023-01-03 LAB
ANION GAP SERPL CALC-SCNC: 9 MMOL/L (ref 0–18)
BASOPHILS # BLD AUTO: 0.04 X10(3) UL (ref 0–0.2)
BASOPHILS NFR BLD AUTO: 0.3 %
BUN BLD-MCNC: 15 MG/DL (ref 7–18)
BUN/CREAT SERPL: 15.6 (ref 10–20)
CALCIUM BLD-MCNC: 9 MG/DL (ref 8.5–10.1)
CHLORIDE SERPL-SCNC: 105 MMOL/L (ref 98–112)
CO2 SERPL-SCNC: 22 MMOL/L (ref 21–32)
CREAT BLD-MCNC: 0.96 MG/DL
DEPRECATED RDW RBC AUTO: 41.1 FL (ref 35.1–46.3)
EOSINOPHIL # BLD AUTO: 0.01 X10(3) UL (ref 0–0.7)
EOSINOPHIL NFR BLD AUTO: 0.1 %
ERYTHROCYTE [DISTWIDTH] IN BLOOD BY AUTOMATED COUNT: 12.8 % (ref 11–15)
GFR SERPLBLD BASED ON 1.73 SQ M-ARVRAT: 83 ML/MIN/1.73M2 (ref 60–?)
GLUCOSE BLD-MCNC: 110 MG/DL (ref 70–99)
HCT VFR BLD AUTO: 41.1 %
HGB BLD-MCNC: 14 G/DL
IMM GRANULOCYTES # BLD AUTO: 0.08 X10(3) UL (ref 0–1)
IMM GRANULOCYTES NFR BLD: 0.7 %
LYMPHOCYTES # BLD AUTO: 2.17 X10(3) UL (ref 1–4)
LYMPHOCYTES NFR BLD AUTO: 17.7 %
MCH RBC QN AUTO: 29.9 PG (ref 26–34)
MCHC RBC AUTO-ENTMCNC: 34.1 G/DL (ref 31–37)
MCV RBC AUTO: 87.8 FL
MONOCYTES # BLD AUTO: 0.67 X10(3) UL (ref 0.1–1)
MONOCYTES NFR BLD AUTO: 5.5 %
NEUTROPHILS # BLD AUTO: 9.3 X10 (3) UL (ref 1.5–7.7)
NEUTROPHILS # BLD AUTO: 9.3 X10(3) UL (ref 1.5–7.7)
NEUTROPHILS NFR BLD AUTO: 75.7 %
OSMOLALITY SERPL CALC.SUM OF ELEC: 283 MOSM/KG (ref 275–295)
PLATELET # BLD AUTO: 276 10(3)UL (ref 150–450)
POTASSIUM SERPL-SCNC: 4.1 MMOL/L (ref 3.5–5.1)
RBC # BLD AUTO: 4.68 X10(6)UL
SARS-COV-2 RNA RESP QL NAA+PROBE: NOT DETECTED
SODIUM SERPL-SCNC: 136 MMOL/L (ref 136–145)
WBC # BLD AUTO: 12.3 X10(3) UL (ref 4–11)

## 2023-01-03 PROCEDURE — 95816 EEG AWAKE AND DROWSY: CPT | Performed by: OTHER

## 2023-01-03 PROCEDURE — 99222 1ST HOSP IP/OBS MODERATE 55: CPT | Performed by: HOSPITALIST

## 2023-01-03 PROCEDURE — 70450 CT HEAD/BRAIN W/O DYE: CPT | Performed by: EMERGENCY MEDICINE

## 2023-01-03 PROCEDURE — 99223 1ST HOSP IP/OBS HIGH 75: CPT | Performed by: OTHER

## 2023-01-03 RX ORDER — ONDANSETRON 2 MG/ML
4 INJECTION INTRAMUSCULAR; INTRAVENOUS EVERY 6 HOURS PRN
Status: DISCONTINUED | OUTPATIENT
Start: 2023-01-03 | End: 2023-01-04

## 2023-01-03 RX ORDER — LORAZEPAM 2 MG/ML
1 INJECTION INTRAMUSCULAR ONCE
Status: COMPLETED | OUTPATIENT
Start: 2023-01-03 | End: 2023-01-03

## 2023-01-03 RX ORDER — PROCHLORPERAZINE EDISYLATE 5 MG/ML
5 INJECTION INTRAMUSCULAR; INTRAVENOUS EVERY 8 HOURS PRN
Status: DISCONTINUED | OUTPATIENT
Start: 2023-01-03 | End: 2023-01-04

## 2023-01-03 RX ORDER — SUMATRIPTAN 50 MG/1
50 TABLET, FILM COATED ORAL EVERY 2 HOUR PRN
Status: DISCONTINUED | OUTPATIENT
Start: 2023-01-03 | End: 2023-01-04

## 2023-01-03 RX ORDER — LEVETIRACETAM 250 MG/1
750 TABLET ORAL 2 TIMES DAILY
Status: DISCONTINUED | OUTPATIENT
Start: 2023-01-03 | End: 2023-01-04

## 2023-01-03 RX ORDER — KETOROLAC TROMETHAMINE 15 MG/ML
15 INJECTION, SOLUTION INTRAMUSCULAR; INTRAVENOUS EVERY 6 HOURS PRN
Status: DISCONTINUED | OUTPATIENT
Start: 2023-01-03 | End: 2023-01-04

## 2023-01-03 RX ORDER — PANTOPRAZOLE SODIUM 40 MG/1
40 TABLET, DELAYED RELEASE ORAL
Status: DISCONTINUED | OUTPATIENT
Start: 2023-01-03 | End: 2023-01-04

## 2023-01-03 RX ORDER — SPIRONOLACTONE 25 MG/1
100 TABLET ORAL DAILY
Status: DISCONTINUED | OUTPATIENT
Start: 2023-01-03 | End: 2023-01-04

## 2023-01-03 RX ORDER — LEVETIRACETAM 500 MG/5ML
1000 INJECTION, SOLUTION, CONCENTRATE INTRAVENOUS ONCE
Status: COMPLETED | OUTPATIENT
Start: 2023-01-03 | End: 2023-01-03

## 2023-01-03 RX ORDER — FOLIC ACID 1 MG/1
1 TABLET ORAL DAILY
Status: DISCONTINUED | OUTPATIENT
Start: 2023-01-03 | End: 2023-01-04

## 2023-01-03 RX ORDER — HEPARIN SODIUM 5000 [USP'U]/ML
5000 INJECTION, SOLUTION INTRAVENOUS; SUBCUTANEOUS EVERY 12 HOURS SCHEDULED
Status: DISCONTINUED | OUTPATIENT
Start: 2023-01-03 | End: 2023-01-04

## 2023-01-03 RX ORDER — CYCLOBENZAPRINE HCL 10 MG
10 TABLET ORAL 3 TIMES DAILY PRN
Status: DISCONTINUED | OUTPATIENT
Start: 2023-01-03 | End: 2023-01-04

## 2023-01-03 RX ORDER — LORAZEPAM 2 MG/ML
INJECTION INTRAMUSCULAR
Status: COMPLETED
Start: 2023-01-03 | End: 2023-01-03

## 2023-01-03 RX ORDER — ACETAMINOPHEN 500 MG
500 TABLET ORAL EVERY 4 HOURS PRN
Status: DISCONTINUED | OUTPATIENT
Start: 2023-01-03 | End: 2023-01-04

## 2023-01-03 RX ORDER — POLYETHYLENE GLYCOL 3350 17 G/17G
17 POWDER, FOR SOLUTION ORAL DAILY PRN
Status: DISCONTINUED | OUTPATIENT
Start: 2023-01-03 | End: 2023-01-04

## 2023-01-03 RX ORDER — ESCITALOPRAM OXALATE 20 MG/1
20 TABLET ORAL DAILY
Status: DISCONTINUED | OUTPATIENT
Start: 2023-01-03 | End: 2023-01-04

## 2023-01-03 RX ORDER — SODIUM CHLORIDE 9 MG/ML
INJECTION, SOLUTION INTRAVENOUS CONTINUOUS
Status: DISCONTINUED | OUTPATIENT
Start: 2023-01-03 | End: 2023-01-04

## 2023-01-03 RX ORDER — SENNOSIDES 8.6 MG
17.2 TABLET ORAL NIGHTLY PRN
Status: DISCONTINUED | OUTPATIENT
Start: 2023-01-03 | End: 2023-01-04

## 2023-01-03 NOTE — ED QUICK NOTES
Seizure activity lasted approx. 1 minute. Patient postictal at this time. Eye opening to verbal. Will continue to monitor.

## 2023-01-03 NOTE — ED QUICK NOTES
Orders for admission, patient is aware of plan and ready to go upstairs. Any questions, please call ED RN Florencio Sousa at extension [phone].      Patient Covid vaccination status: Unvaccinated     COVID Test Ordered in ED: Rapid SARS-CoV-2 by PCR    COVID Suspicion at Admission: N/A    Running Infusions:  None    Mental Status/LOC at time of transport: Postictal    Other pertinent information:   CIWA score: N/A   NIH score:  N/A

## 2023-01-03 NOTE — ED QUICK NOTES
Patient starting to become more alert. Spontaneous eye opening but slow to respond. Will continue to monitor.

## 2023-01-03 NOTE — ED INITIAL ASSESSMENT (HPI)
Pt presents from home with c/o of having a seizure 30-40 min PTA. Pt reports  witnessed it and she is unsure how long it lasted. Pt states she has had only one in the past and is unsure what brought it on.

## 2023-01-04 ENCOUNTER — APPOINTMENT (OUTPATIENT)
Dept: MRI IMAGING | Facility: HOSPITAL | Age: 28
End: 2023-01-04
Attending: Other
Payer: COMMERCIAL

## 2023-01-04 VITALS
HEART RATE: 77 BPM | SYSTOLIC BLOOD PRESSURE: 113 MMHG | RESPIRATION RATE: 18 BRPM | BODY MASS INDEX: 23.29 KG/M2 | OXYGEN SATURATION: 98 % | TEMPERATURE: 98 F | HEIGHT: 65 IN | DIASTOLIC BLOOD PRESSURE: 66 MMHG | WEIGHT: 139.81 LBS

## 2023-01-04 LAB
ANION GAP SERPL CALC-SCNC: 4 MMOL/L (ref 0–18)
BASOPHILS # BLD AUTO: 0.03 X10(3) UL (ref 0–0.2)
BASOPHILS NFR BLD AUTO: 0.4 %
BUN BLD-MCNC: 8 MG/DL (ref 7–18)
BUN/CREAT SERPL: 11 (ref 10–20)
CALCIUM BLD-MCNC: 8.3 MG/DL (ref 8.5–10.1)
CHLORIDE SERPL-SCNC: 110 MMOL/L (ref 98–112)
CO2 SERPL-SCNC: 24 MMOL/L (ref 21–32)
CREAT BLD-MCNC: 0.73 MG/DL
DEPRECATED RDW RBC AUTO: 43.7 FL (ref 35.1–46.3)
EOSINOPHIL # BLD AUTO: 0.04 X10(3) UL (ref 0–0.7)
EOSINOPHIL NFR BLD AUTO: 0.6 %
ERYTHROCYTE [DISTWIDTH] IN BLOOD BY AUTOMATED COUNT: 13.1 % (ref 11–15)
GFR SERPLBLD BASED ON 1.73 SQ M-ARVRAT: 116 ML/MIN/1.73M2 (ref 60–?)
GLUCOSE BLD-MCNC: 87 MG/DL (ref 70–99)
HCT VFR BLD AUTO: 36.4 %
HGB BLD-MCNC: 12.1 G/DL
IMM GRANULOCYTES # BLD AUTO: 0.01 X10(3) UL (ref 0–1)
IMM GRANULOCYTES NFR BLD: 0.1 %
LYMPHOCYTES # BLD AUTO: 3.47 X10(3) UL (ref 1–4)
LYMPHOCYTES NFR BLD AUTO: 48.2 %
MCH RBC QN AUTO: 30.3 PG (ref 26–34)
MCHC RBC AUTO-ENTMCNC: 33.2 G/DL (ref 31–37)
MCV RBC AUTO: 91 FL
MONOCYTES # BLD AUTO: 0.4 X10(3) UL (ref 0.1–1)
MONOCYTES NFR BLD AUTO: 5.6 %
NEUTROPHILS # BLD AUTO: 3.25 X10 (3) UL (ref 1.5–7.7)
NEUTROPHILS # BLD AUTO: 3.25 X10(3) UL (ref 1.5–7.7)
NEUTROPHILS NFR BLD AUTO: 45.1 %
OSMOLALITY SERPL CALC.SUM OF ELEC: 284 MOSM/KG (ref 275–295)
PLATELET # BLD AUTO: 231 10(3)UL (ref 150–450)
POTASSIUM SERPL-SCNC: 4 MMOL/L (ref 3.5–5.1)
RBC # BLD AUTO: 4 X10(6)UL
SODIUM SERPL-SCNC: 138 MMOL/L (ref 136–145)
WBC # BLD AUTO: 7.2 X10(3) UL (ref 4–11)

## 2023-01-04 PROCEDURE — 99239 HOSP IP/OBS DSCHRG MGMT >30: CPT | Performed by: HOSPITALIST

## 2023-01-04 PROCEDURE — 99232 SBSQ HOSP IP/OBS MODERATE 35: CPT | Performed by: OTHER

## 2023-01-04 PROCEDURE — 70553 MRI BRAIN STEM W/O & W/DYE: CPT | Performed by: OTHER

## 2023-01-04 RX ORDER — GADOTERATE MEGLUMINE 376.9 MG/ML
15 INJECTION INTRAVENOUS
Status: COMPLETED | OUTPATIENT
Start: 2023-01-04 | End: 2023-01-04

## 2023-01-04 RX ORDER — LEVETIRACETAM 750 MG/1
750 TABLET ORAL 2 TIMES DAILY
Qty: 60 TABLET | Refills: 1 | Status: SHIPPED | OUTPATIENT
Start: 2023-01-04

## 2023-01-04 RX ORDER — DIAZEPAM 5 MG/1
5 TABLET ORAL ONCE
Status: COMPLETED | OUTPATIENT
Start: 2023-01-04 | End: 2023-01-04

## 2023-01-04 NOTE — PLAN OF CARE
Patient is alert and oriented x 3, Ra, voiding freely, Keppra BID per order, seizure precautions in place, standby assist due to hx of seizures, MRI brain to be completed, patient premedicated due to claustrophobia. Complaints of back pain, lidocaine patch applied and pain medications given. Paperwork given to patient, all questions answered, patient discharged with . Problem: Patient Centered Care  Goal: Patient preferences are identified and integrated in the patient's plan of care  Description: Interventions:  - What would you like us to know as we care for you? .  - Provide timely, complete, and accurate information to patient/family  - Incorporate patient and family knowledge, values, beliefs, and cultural backgrounds into the planning and delivery of care  - Encourage patient/family to participate in care and decision-making at the level they choose  - Honor patient and family perspectives and choices  Outcome: Progressing     Problem: Patient/Family Goals  Goal: Patient/Family Long Term Goal  Description: Patient's Long Term Goal: . Interventions:  - .  - See additional Care Plan goals for specific interventions  Outcome: Progressing  Goal: Patient/Family Short Term Goal  Description: Patient's Short Term Goal: .     Interventions:   - ..  - See additional Care Plan goals for specific interventions  Outcome: Progressing     Problem: PAIN - ADULT  Goal: Verbalizes/displays adequate comfort level or patient's stated pain goal  Description: INTERVENTIONS:  - Encourage pt to monitor pain and request assistance  - Assess pain using appropriate pain scale  - Administer analgesics based on type and severity of pain and evaluate response  - Implement non-pharmacological measures as appropriate and evaluate response  - Consider cultural and social influences on pain and pain management  - Manage/alleviate anxiety  - Utilize distraction and/or relaxation techniques  - Monitor for opioid side effects  - Notify MD/LIP if interventions unsuccessful or patient reports new pain  - Anticipate increased pain with activity and pre-medicate as appropriate  Outcome: Progressing     Problem: SAFETY ADULT - FALL  Goal: Free from fall injury  Description: INTERVENTIONS:  - Assess pt frequently for physical needs  - Identify cognitive and physical deficits and behaviors that affect risk of falls.   - [place] fall precautions as indicated by assessment.  - Educate pt/family on patient safety including physical limitations  - Instruct pt to call for assistance with activity based on assessment  - Modify environment to reduce risk of injury  - Provide assistive devices as appropriate  - Consider OT/PT consult to assist with strengthening/mobility  - Encourage toileting schedule  Outcome: Progressing     Problem: DISCHARGE PLANNING  Goal: Discharge to home or other facility with appropriate resources  Description: INTERVENTIONS:  - Identify barriers to discharge w/pt and caregiver  - Include patient/family/discharge partner in discharge planning  - Arrange for needed discharge resources and transportation as appropriate  - Identify discharge learning needs (meds, wound care, etc)  - Arrange for interpreters to assist at discharge as needed  - Consider post-discharge preferences of patient/family/discharge partner  - Complete POLST form as appropriate  - Assess patient's ability to be responsible for managing their own health  - Refer to Case Management Department for coordinating discharge planning if the patient needs post-hospital services based on physician/LIP order or complex needs related to functional status, cognitive ability or social support system  Outcome: Progressing

## 2023-01-04 NOTE — PLAN OF CARE
Patient alert and oriented X4. Nonsurgical; admitted from ED for seizure observation. 0.9 normal saline running through IV. Up and walking with standby assist. Self void. SCD's and heparin for DVT prophylaxis. Pain management with toradol, flexeril, and tylenol. General diet. Seizure precautions in place including padded side rails. Fall precautions in place including bed in lowest position, call light and personal belongings within reach, non-skid socks. Frequent rounding by nursing staff. Plan is to go home. Problem: Patient Centered Care  Goal: Patient preferences are identified and integrated in the patient's plan of care  Description: Interventions:  - What would you like us to know as we care for you?  From home with   - Provide timely, complete, and accurate information to patient/family  - Incorporate patient and family knowledge, values, beliefs, and cultural backgrounds into the planning and delivery of care  - Encourage patient/family to participate in care and decision-making at the level they choose  - Honor patient and family perspectives and choices  Outcome: Progressing     Problem: Patient/Family Goals  Goal: Patient/Family Long Term Goal  Description: Patient's Long Term Goal: Go home    Interventions:  - monitor seizures  - manage pain with PRN meds  - See additional Care Plan goals for specific interventions  Outcome: Progressing  Goal: Patient/Family Short Term Goal  Description: Patient's Short Term Goal: Manage back pain    Interventions:   - PRN meds as needed  - reposition patient  - ambulation  - See additional Care Plan goals for specific interventions  Outcome: Progressing     Problem: PAIN - ADULT  Goal: Verbalizes/displays adequate comfort level or patient's stated pain goal  Description: INTERVENTIONS:  - Encourage pt to monitor pain and request assistance  - Assess pain using appropriate pain scale  - Administer analgesics based on type and severity of pain and evaluate response  - Implement non-pharmacological measures as appropriate and evaluate response  - Consider cultural and social influences on pain and pain management  - Manage/alleviate anxiety  - Utilize distraction and/or relaxation techniques  - Monitor for opioid side effects  - Notify MD/LIP if interventions unsuccessful or patient reports new pain  - Anticipate increased pain with activity and pre-medicate as appropriate  Outcome: Progressing     Problem: SAFETY ADULT - FALL  Goal: Free from fall injury  Description: INTERVENTIONS:  - Assess pt frequently for physical needs  - Identify cognitive and physical deficits and behaviors that affect risk of falls.   - [place] fall precautions as indicated by assessment.  - Educate pt/family on patient safety including physical limitations  - Instruct pt to call for assistance with activity based on assessment  - Modify environment to reduce risk of injury  - Provide assistive devices as appropriate  - Consider OT/PT consult to assist with strengthening/mobility  - Encourage toileting schedule  Outcome: Progressing     Problem: DISCHARGE PLANNING  Goal: Discharge to home or other facility with appropriate resources  Description: INTERVENTIONS:  - Identify barriers to discharge w/pt and caregiver  - Include patient/family/discharge partner in discharge planning  - Arrange for needed discharge resources and transportation as appropriate  - Identify discharge learning needs (meds, wound care, etc)  - Arrange for interpreters to assist at discharge as needed  - Consider post-discharge preferences of patient/family/discharge partner  - Complete POLST form as appropriate  - Assess patient's ability to be responsible for managing their own health  - Refer to Case Management Department for coordinating discharge planning if the patient needs post-hospital services based on physician/LIP order or complex needs related to functional status, cognitive ability or social support system  Outcome: Progressing

## 2023-01-04 NOTE — PLAN OF CARE
Patient received from the ED. Patient alert and orientated x4. VSS. No acute changes noted throughout shift. Receiving 0.9 NS IV fluids per MD order. Patient is on room air. PRN Pain medication provided as needed. Neurology on consult. Fall precautions maintained- bed alarm on, bed locked in lowest position, call light and personal belongings within reach, non-skid socks in place to bilateral feet. Seizure precautions maintained side rails padded. Up with standby assist. Frequent rounding by nursing staff. Plan for MRI to be done anytime in the evening.              Problem: Patient Centered Care  Goal: Patient preferences are identified and integrated in the patient's plan of care  Description: Interventions:  - What would you like us to know as we care for you?   - Provide timely, complete, and accurate information to patient/family  - Incorporate patient and family knowledge, values, beliefs, and cultural backgrounds into the planning and delivery of care  - Encourage patient/family to participate in care and decision-making at the level they choose  - Honor patient and family perspectives and choices  Outcome: Progressing     Problem: Patient/Family Goals  Goal: Patient/Family Long Term Goal  Description: Patient's Long Term Goal:     Interventions:  - See additional Care Plan goals for specific interventions  Outcome: Progressing  Goal: Patient/Family Short Term Goal  Description: Patient's Short Term Goal:     Interventions:   - See additional Care Plan goals for specific interventions  Outcome: Progressing     Problem: PAIN - ADULT  Goal: Verbalizes/displays adequate comfort level or patient's stated pain goal  Description: INTERVENTIONS:  - Encourage pt to monitor pain and request assistance  - Assess pain using appropriate pain scale  - Administer analgesics based on type and severity of pain and evaluate response  - Implement non-pharmacological measures as appropriate and evaluate response  - Consider cultural and social influences on pain and pain management  - Manage/alleviate anxiety  - Utilize distraction and/or relaxation techniques  - Monitor for opioid side effects  - Notify MD/LIP if interventions unsuccessful or patient reports new pain  - Anticipate increased pain with activity and pre-medicate as appropriate  Outcome: Progressing     Problem: SAFETY ADULT - FALL  Goal: Free from fall injury  Description: INTERVENTIONS:  - Assess pt frequently for physical needs  - Identify cognitive and physical deficits and behaviors that affect risk of falls.   - [place] fall precautions as indicated by assessment.  - Educate pt/family on patient safety including physical limitations  - Instruct pt to call for assistance with activity based on assessment  - Modify environment to reduce risk of injury  - Provide assistive devices as appropriate  - Consider OT/PT consult to assist with strengthening/mobility  - Encourage toileting schedule  Outcome: Progressing     Problem: DISCHARGE PLANNING  Goal: Discharge to home or other facility with appropriate resources  Description: INTERVENTIONS:  - Identify barriers to discharge w/pt and caregiver  - Include patient/family/discharge partner in discharge planning  - Arrange for needed discharge resources and transportation as appropriate  - Identify discharge learning needs (meds, wound care, etc)  - Arrange for interpreters to assist at discharge as needed  - Consider post-discharge preferences of patient/family/discharge partner  - Complete POLST form as appropriate  - Assess patient's ability to be responsible for managing their own health  - Refer to Case Management Department for coordinating discharge planning if the patient needs post-hospital services based on physician/LIP order or complex needs related to functional status, cognitive ability or social support system  Outcome: Progressing

## 2023-01-05 ENCOUNTER — PATIENT OUTREACH (OUTPATIENT)
Dept: CASE MANAGEMENT | Age: 28
End: 2023-01-05

## 2023-01-05 DIAGNOSIS — Z02.9 ENCOUNTERS FOR UNSPECIFIED ADMINISTRATIVE PURPOSE: ICD-10-CM

## 2023-01-05 DIAGNOSIS — R56.9 SEIZURE (HCC): ICD-10-CM

## 2023-01-06 NOTE — PROGRESS NOTES
Left message on mailbox for pt to call [facility] back for TCM. [facility] contact information included in message. Records show pt sees Dr. Ashanti Mcclelland ([phone]), not Dr. Nova Colunga. Follow up With Specialties Details Why Contact Info   Esteban Brantley Schedule an appointment as soon as possible for a visit in 1 month(s)  [address]   [address] [place] [id number]   [phone]    Ross Gonzales MD Internal Medicine, HOSPITALIST Follow up in 1 week(s)  [address] [place] [address] [phone]

## 2023-01-10 ENCOUNTER — TELEPHONE (OUTPATIENT)
Dept: INTERNAL MEDICINE CLINIC | Facility: CLINIC | Age: 28
End: 2023-01-10

## 2023-01-10 NOTE — TELEPHONE ENCOUNTER
Spoke to pt for TCM today. Pt agreeable to TCM/HFU appt with Dr. Destiny Phan to book d/t schedule limitations. TCM/HFU appt recommended by 1/18/2023, as pt is a moderate risk for readmission. Please discuss with PCP and f/u with pt, accordingly. Pt reports she is free any weekday, as she only works 3rd shift on weekends. Thank you!

## 2023-01-13 DIAGNOSIS — G40.909 NONINTRACTABLE EPILEPSY WITHOUT STATUS EPILEPTICUS, UNSPECIFIED EPILEPSY TYPE (HCC): ICD-10-CM

## 2023-01-16 NOTE — TELEPHONE ENCOUNTER
Medication: FOLIC ACID 1 MG Oral Tab, 1 TABLET DAILY    LOV:  8/18/2022  NOV: none    Last refill/[place]: 11/28/2022

## 2023-01-18 RX ORDER — FOLIC ACID 1 MG/1
1 TABLET ORAL DAILY
Qty: 90 TABLET | Refills: 0 | Status: SHIPPED | OUTPATIENT
Start: 2023-01-18

## 2023-02-02 ENCOUNTER — OFFICE VISIT (OUTPATIENT)
Dept: INTERNAL MEDICINE CLINIC | Facility: CLINIC | Age: 28
End: 2023-02-02
Payer: COMMERCIAL

## 2023-02-02 VITALS
HEART RATE: 83 BPM | TEMPERATURE: 98 F | WEIGHT: 136 LBS | DIASTOLIC BLOOD PRESSURE: 70 MMHG | SYSTOLIC BLOOD PRESSURE: 100 MMHG | BODY MASS INDEX: 23 KG/M2 | OXYGEN SATURATION: 98 %

## 2023-02-02 DIAGNOSIS — G40.909 NONINTRACTABLE EPILEPSY WITHOUT STATUS EPILEPTICUS, UNSPECIFIED EPILEPSY TYPE (HCC): Primary | ICD-10-CM

## 2023-02-02 PROCEDURE — 3074F SYST BP LT 130 MM HG: CPT | Performed by: INTERNAL MEDICINE

## 2023-02-02 PROCEDURE — 99214 OFFICE O/P EST MOD 30 MIN: CPT | Performed by: INTERNAL MEDICINE

## 2023-02-02 PROCEDURE — 3078F DIAST BP <80 MM HG: CPT | Performed by: INTERNAL MEDICINE

## 2023-02-02 RX ORDER — CLINDAMYCIN PHOSPHATE 11.9 MG/ML
SOLUTION TOPICAL 2 TIMES DAILY
COMMUNITY
End: 2023-02-02

## 2023-02-02 RX ORDER — CLINDAMYCIN PHOSPHATE 10 MG/G
1 GEL TOPICAL 2 TIMES DAILY
Qty: 60 G | Refills: 2 | Status: SHIPPED | OUTPATIENT
Start: 2023-02-02 | End: 2024-01-28

## 2023-02-02 RX ORDER — SPIRONOLACTONE 100 MG/1
TABLET, FILM COATED ORAL
Qty: 90 TABLET | Refills: 0 | Status: SHIPPED | OUTPATIENT
Start: 2023-02-02

## 2023-03-12 RX ORDER — DROSPIRENONE AND ETHINYL ESTRADIOL 0.02-3(28)
1 KIT ORAL DAILY
Qty: 28 TABLET | Refills: 2 | Status: SHIPPED | OUTPATIENT
Start: 2023-03-12

## 2023-03-12 RX ORDER — ESCITALOPRAM OXALATE 20 MG/1
TABLET ORAL
Qty: 90 TABLET | Refills: 0 | Status: SHIPPED | OUTPATIENT
Start: 2023-03-12

## 2023-04-03 DIAGNOSIS — G40.909 NONINTRACTABLE EPILEPSY WITHOUT STATUS EPILEPTICUS, UNSPECIFIED EPILEPSY TYPE (HCC): Primary | ICD-10-CM

## 2023-04-03 NOTE — TELEPHONE ENCOUNTER
Medication: levETIRAcetam 750 MG Oral Tab, Take 1 tablet (750 mg total) by mouth 2 (two) times daily.     LOV: 8/18/22  NOV: 6/15/23    Last refill/[place]: 1/4/23 (#60/1RF)

## 2023-04-05 RX ORDER — LEVETIRACETAM 750 MG/1
750 TABLET ORAL 2 TIMES DAILY
Qty: 180 TABLET | Refills: 0 | Status: SHIPPED | OUTPATIENT
Start: 2023-04-05

## 2023-04-20 ENCOUNTER — OFFICE VISIT (OUTPATIENT)
Dept: NEUROLOGY | Facility: CLINIC | Age: 28
End: 2023-04-20
Payer: COMMERCIAL

## 2023-04-20 VITALS — BODY MASS INDEX: 22.66 KG/M2 | HEIGHT: 65 IN | WEIGHT: 136 LBS

## 2023-04-20 DIAGNOSIS — G40.909 NONINTRACTABLE EPILEPSY WITHOUT STATUS EPILEPTICUS, UNSPECIFIED EPILEPSY TYPE (HCC): ICD-10-CM

## 2023-04-20 PROCEDURE — 3008F BODY MASS INDEX DOCD: CPT | Performed by: OTHER

## 2023-04-20 PROCEDURE — 99214 OFFICE O/P EST MOD 30 MIN: CPT | Performed by: OTHER

## 2023-04-20 RX ORDER — FOLIC ACID 1 MG/1
1 TABLET ORAL DAILY
Qty: 90 TABLET | Refills: 3 | Status: SHIPPED | OUTPATIENT
Start: 2023-04-20 | End: 2024-04-19

## 2023-04-20 RX ORDER — LEVETIRACETAM 750 MG/1
750 TABLET ORAL 2 TIMES DAILY
Qty: 180 TABLET | Refills: 1 | Status: SHIPPED | OUTPATIENT
Start: 2023-04-20 | End: 2023-10-17

## 2023-05-04 RX ORDER — ESCITALOPRAM OXALATE 20 MG/1
20 TABLET ORAL DAILY
Qty: 90 TABLET | Refills: 0 | Status: SHIPPED | OUTPATIENT
Start: 2023-05-04

## 2023-05-12 DIAGNOSIS — G40.909 NONINTRACTABLE EPILEPSY WITHOUT STATUS EPILEPTICUS, UNSPECIFIED EPILEPSY TYPE (HCC): ICD-10-CM

## 2023-05-15 RX ORDER — LEVETIRACETAM 750 MG/1
TABLET ORAL
Qty: 180 TABLET | Refills: 1 | OUTPATIENT
Start: 2023-05-15

## 2023-05-15 NOTE — TELEPHONE ENCOUNTER
Refill Request    Prescription refills on file  patient has Rx refills on file (#180/1 refill) 04/20/2023

## 2023-06-16 RX ORDER — DROSPIRENONE AND ETHINYL ESTRADIOL 0.02-3(28)
1 KIT ORAL DAILY
Qty: 28 TABLET | Refills: 2 | Status: SHIPPED | OUTPATIENT
Start: 2023-06-16

## 2023-07-18 DIAGNOSIS — G40.909 NONINTRACTABLE EPILEPSY WITHOUT STATUS EPILEPTICUS, UNSPECIFIED EPILEPSY TYPE (HCC): ICD-10-CM

## 2023-07-19 RX ORDER — LEVETIRACETAM 750 MG/1
750 TABLET ORAL 2 TIMES DAILY
Qty: 180 TABLET | Refills: 1 | OUTPATIENT
Start: 2023-07-19

## 2023-07-24 DIAGNOSIS — G40.909 NONINTRACTABLE EPILEPSY WITHOUT STATUS EPILEPTICUS, UNSPECIFIED EPILEPSY TYPE (HCC): ICD-10-CM

## 2023-07-24 RX ORDER — LEVETIRACETAM 750 MG/1
750 TABLET ORAL 2 TIMES DAILY
Qty: 180 TABLET | Refills: 0 | Status: SHIPPED | OUTPATIENT
Start: 2023-07-24 | End: 2024-01-20

## 2023-07-24 NOTE — TELEPHONE ENCOUNTER
Requested Prescriptions     Pending Prescriptions Disp Refills    levetiracetam 750 MG Oral Tab 180 tablet 1     Sig: Take 1 tablet (750 mg total) by mouth 2 (two) times daily.          Last OV: 4/20/23  Next OV: None  Last refilled: 4/20/23

## 2023-10-27 DIAGNOSIS — G40.909 NONINTRACTABLE EPILEPSY WITHOUT STATUS EPILEPTICUS, UNSPECIFIED EPILEPSY TYPE (HCC): ICD-10-CM

## 2023-10-30 RX ORDER — LEVETIRACETAM 750 MG/1
750 TABLET ORAL 2 TIMES DAILY
Qty: 180 TABLET | Refills: 0 | Status: SHIPPED | OUTPATIENT
Start: 2023-10-30

## 2023-10-30 NOTE — TELEPHONE ENCOUNTER
Requested Prescriptions     Pending Prescriptions Disp Refills    LEVETIRACETAM 750 MG Oral Tab [Pharmacy Med Name: LEVETIRACETAM 750MG TABLETS] 180 tablet 0     Sig: TAKE 1 TABLET(750 MG) BY MOUTH TWICE DAILY        LOV: 4/20/23  Return in about 2 months (around 7/2/2023)   NOV: 7/24/23    Last refill/[place]: 7/24/23 (KLM503/0RF)

## 2024-01-03 ENCOUNTER — TELEMEDICINE (OUTPATIENT)
Dept: NEUROLOGY | Facility: CLINIC | Age: 29
End: 2024-01-03
Payer: COMMERCIAL

## 2024-01-03 DIAGNOSIS — G40.909 NONINTRACTABLE EPILEPSY WITHOUT STATUS EPILEPTICUS, UNSPECIFIED EPILEPSY TYPE (HCC): Primary | ICD-10-CM

## 2024-01-03 PROCEDURE — 99212 OFFICE O/P EST SF 10 MIN: CPT | Performed by: OTHER

## 2024-01-03 RX ORDER — LEVETIRACETAM 750 MG/1
750 TABLET ORAL 2 TIMES DAILY
Qty: 180 TABLET | Refills: 1 | Status: SHIPPED | OUTPATIENT
Start: 2024-01-03 | End: 2024-07-01

## 2024-01-03 NOTE — PROGRESS NOTES
I conducted a telehealth visit with Yari Oscar today, 01/03/24, which was completed using two-way, real-time interactive audio and video communication. This has been done in good lyric to provide continuity of care in the best interest of the provider-patient relationship, due to the COVID -19 public health crisis/national emergency where restrictions of face-to-face office visits are ongoing. Every conscious effort was taken to allow for sufficient and adequate time to complete the visit.  The patient was made aware of the limitations of the telehealth visit, including treatment limitations as no physical exam could be performed.  The patient was advised to call 911 or to go to the ER in case there was an emergency.  The patient was also advised of the potential privacy & security concerns related to the telehealth platform.   The patient was made aware of where to find [facility]'s notice of privacy practices, telehealth consent form and other related consent forms and documents.  which are located on the [facility] website. The patient verbally agreed to telehealth consent form, related consents and the risks discussed.    Lastly, the patient confirmed that they were in [place].   Included in this visit, time may have been spent reviewing labs, medications, radiology tests and decision making. Appropriate medical decision-making and tests are ordered as detailed in the plan of care above.  Coding/billing information is submitted for this visit based on complexity of care and/or time spent for the visit.        [facility] [address], [address]  [facility] [address]  [phone]        Neurology Clinic Telemedicine Follow Up Note    Chief Complaint:  Seizures      HPI:   Yari Oscar is a 28 year old woman seen in follow up for her epilepsy. She had breakthrough seizures b/c she had multiple drinks in January 2023 and had missed doses in November 2023.  She had a seizure on  11/20/2023.  Prior to that she had breakthrough seizures because she had multiple alcoholic beverages.    She reports she was adherent with her Keppra 500 mg twice daily but she had 6 drinks.    After she was seen in August 2023 she was not drinking regularly. She would drink 3 days out of a 7.   She would have 2 to 3 drinks.  She did not get her EEG or MRI b/c she still was paying of her hospital bill from [place]. She reports she was in collections now [facility].    01/03/24 Interval History/Subjective :     Breakthrough seizure.  She reports it was because \"of the holidays.\"  [place] visiting [place].  She missed multiple days of her antiseizure medication.  She missed 3 or 4 doses.  She states that this was \"a wake-up call that I really do of epilepsy.\"  I had stressed to the patient multiple times in her 2 prior visits that she has epilepsy and that she needs to be on an antiseizure drug.  She thought that perhaps in the past she had multiple seizures because of her alcohol use.  Again I explained that she needs to abstain from drinking and that she needs to take an antiseizure medication.  She states that she is aware.  Right now she is working at [employer].  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  I advised her that even though she has had breakthrough seizures because of nonadherence she cannot drive any earlier than 6 months.  I need to see that she can be adherent to the medication for 6 months.  She was last seen in April.  She could have resumed driving in late October 2023.  I do not know if she did or did not resume driving.  However now she has had another seizure in November.    ROS: Pertinent positive and negatives per HPI.  All others were reviewed and negative.         Medications:  Current Outpatient Medications   Medication Instructions    Clindamycin Phosphate 1 % External Gel 1  Application , Topical, 2 times daily    DROSPIRENONE-ETHINYL ESTRADIOL 3-0.02 MG Oral Tab 1 tablet, Oral, Daily    escitalopram (LEXAPRO) 20 mg, Oral, Daily    folic acid (FOLVITE) 1 mg, Oral, Daily    levetiracetam (KEPPRA) 750 mg, Oral, 2 times daily    naproxen (NAPROSYN) 500 mg, Oral, 2 times daily with meals    SPIRONOLACTONE 100 MG Oral Tab TAKE 1 TABLET BY MOUTH DAILY    SUMAtriptan (IMITREX) 50 mg, Oral, Every 2 hour PRN        Reviewed and assessed      Objective:    Last vitals and weight :  There is no height or weight on file to calculate BMI.   There were no vitals filed for this visit.     Exam:  - General: appears stated age and no distress  - Pulmonary: Normal excursion of the chest.  No signs of respiratory distress.  Neurologic Exam    - Mental Status: Alert and attentive. .  Speech is spontaneous, fluent, and prosodic. Comprehension intact.  Repetition intact. Phrase length and rate are normal.  Fund of knowledge are good.  Recent and remote memory is intact.  Able to provide history.     No paraphasic errors, anomia, acalculia, neglect, or R/L confusion.   - Cranial Nerves: No gaze preference. Visual fields: Patient reports he can see the examiner's entire face without any defects or missing segments.  Pupillary light reflex assessed by having the patient cover-uncover each eye.  Pupils are equally round and reactive to light  in a well lit room. EOMI. No nystagmus. No ptosis.  No bitemporal atrophy.  No masseter atrophy.  No jaw deviation.  Patient reports sensation in all 3 branches of the trigeminal is intact and symmetric.  No pathological facial asymmetry. No flattening of the nasolabial fold. .  Hearing grossly intact.  Tongue midline. No atrophy or fasiculations of the tongue noted. Palate and uvula elevate symmetrically.  Shoulder shrug symmetric.  Able to turn her head left and right.    - Motor:  normal tone/bulk.   Motor Strength:  moving all 4 extremities symmetrically and equally.     Pronator drift: No pronator drift   Arm Rolling: No orbiting.   Finger Taps: Finger taps are symmetric in rate and amplitude.    Foot Taps: Foot taps are symmetric.     Tremor/Abnormal movements: No tremor/abnormal movements appreciated.      - Sensory:   Light touch:  intact. Denies deficits.  - Cerebellum: No truncal ataxia. No titubations. No dysmetria, no dysdiadochokinesis. No overshoot.     - Gait/station: Normal gait and station. Symmetric arm swing.         Most recent lab results:   Reviewed and assessed  No results found for this or any previous visit (from the past 36 hour(s)).    Diagnostic studies:  Performed an independent visualization of   Imaging revealed:      Assessment     Yari Oscar is a 28 year old woman seen in follow up for her epilepsy. She had breakthrough seizures b/c she had multiple drinks in January 2023 and had missed doses in November 2023.  She had a seizure on 11/20/2023.  Prior to that she had breakthrough seizures because she had multiple alcoholic beverages.   She could have resumed driving in late October 2023.  I do not know if she did or did not resume driving.  However now she has had another seizure in November.  The earliest she can drive is  Monday, May 20, 2024.    Now that she has actually come to terms with the diagnosis that she has epilepsy I hope she will be adherent with her medications.  I will see her back in 4 months.     Plan   1.  Epilepsy  She needs to take Keppra 750 twice daily.  Denies needing  a refill.  She needs to be on folic acid supplementation.  No driving.  No climbing to heights.  No baths showers only.               This document is not intended to support charting by exception.  Sections left blank in a completed note should be presumed not to have been done.      Disclaimer:   This record was dictated using  Dragon software. There may be errors due to voice recognition problems that were not realized and corrected during the  completion of the note.      I spent 10 minutes counseling the patient regarding the findings of investigations, treatment options, risks and benefits of treatment, and management of care regarding the diagnosis above.        Thank you for allowing me to participate in the care of your patient.    Phil Wagner DO  1/3/2024  2:40 PM

## 2024-06-26 DIAGNOSIS — G40.909 NONINTRACTABLE EPILEPSY WITHOUT STATUS EPILEPTICUS, UNSPECIFIED EPILEPSY TYPE (HCC): ICD-10-CM

## 2024-06-26 RX ORDER — LEVETIRACETAM 750 MG/1
750 TABLET ORAL 2 TIMES DAILY
Qty: 180 TABLET | Refills: 1 | Status: SHIPPED | OUTPATIENT
Start: 2024-06-26 | End: 2024-12-23

## 2024-06-26 NOTE — TELEPHONE ENCOUNTER
Requested Prescriptions     Pending Prescriptions Disp Refills    levetiracetam 750 MG Oral Tab 180 tablet 1     Sig: Take 1 tablet (750 mg total) by mouth 2 (two) times daily.      LOV: 1/3/24  NOV: none    Last refill/[place]: 1/3/24 (qty 180/1rf)    Per LOV (telemed) note from 1/3/24:    Plan  1.  Epilepsy  She needs to take Keppra 750 twice daily.  Denies needing  a refill.  She needs to be on folic acid supplementation.  No driving.  No climbing to heights.  No baths showers only.

## 2024-09-20 ENCOUNTER — OFFICE VISIT (OUTPATIENT)
Dept: NEUROLOGY | Facility: CLINIC | Age: 29
End: 2024-09-20
Payer: COMMERCIAL

## 2024-09-20 VITALS
WEIGHT: 140 LBS | BODY MASS INDEX: 23.32 KG/M2 | DIASTOLIC BLOOD PRESSURE: 81 MMHG | RESPIRATION RATE: 16 BRPM | HEART RATE: 81 BPM | HEIGHT: 65 IN | SYSTOLIC BLOOD PRESSURE: 119 MMHG | OXYGEN SATURATION: 98 %

## 2024-09-20 DIAGNOSIS — G40.909 NONINTRACTABLE EPILEPSY WITHOUT STATUS EPILEPTICUS, UNSPECIFIED EPILEPSY TYPE (HCC): Primary | ICD-10-CM

## 2024-09-20 PROCEDURE — 99214 OFFICE O/P EST MOD 30 MIN: CPT | Performed by: OTHER

## 2024-09-20 RX ORDER — FOLIC ACID 1 MG/1
TABLET ORAL
Qty: 141 TABLET | Refills: 0 | Status: SHIPPED | OUTPATIENT
Start: 2024-09-20 | End: 2025-01-24

## 2024-09-20 RX ORDER — LEVETIRACETAM 750 MG/1
750 TABLET ORAL 2 TIMES DAILY
Qty: 60 TABLET | Refills: 5 | Status: SHIPPED | OUTPATIENT
Start: 2024-09-20 | End: 2025-03-19

## 2024-09-20 NOTE — PROGRESS NOTES
[facility]  [address] [place] [address], [address]  [facility] [place] [address]  [phone]        Neurology Clinic  Follow Up Note    Chief Complaint:  Seizures (LOV 4/20/2023 Nonintractable epilepsy without status epilepticus. Patient here today for epilepsy follow up and medication refills, last seizure was November of 2023. Current medications levetiracetam 750 MG. )      HPI:   Yari Oscar is a 29 year old woman seen in follow up for her epilepsy. She had breakthrough seizures b/c she had multiple drinks in January 2023 and had missed doses in November 2023.  She had a seizure on 11/20/2023.  Prior to that she had breakthrough seizures because she had multiple alcoholic beverages.    She reports she was adherent with her Keppra 500 mg twice daily but she had 6 drinks.    After she was seen in August 2023 she was not drinking regularly. She would drink 3 days out of a 7.   She would have 2 to 3 drinks.  She did not get her EEG or MRI b/c she still was paying of her hospital bill from [place]. She reports she was in collections now with [facility].    09/20/24 Interval History/Subjective :   Here in follow up for epilepsy.  Seizure free since nov 2023.  She has resumed driving.  She wants to start a family.  She is aware she can't stop her keppra.  She is aware of the harms associated w/ seizures during pregnancy.  She was not taking her folate; she will resume now.    01/03/24 Interval History/Subjective :     Breakthrough seizure.  She reports it was because \"of the holidays.\"  [place] was [place] visiting relatives.  She missed multiple days of her antiseizure medication.  She missed 3 or 4 doses.  She states that this was \"a wake-up call that I really do of epilepsy.\"  I had stressed to the patient multiple times in her 2 prior visits that she has epilepsy and that she needs to be on an antiseizure drug.  She thought that perhaps in the past she had multiple seizures because of her  alcohol use.  Again I explained that she needs to abstain from drinking and that she needs to take an antiseizure medication.  She states that she is aware.  Right now she is working at [employer].  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  I advised her that even though she has had breakthrough seizures because of nonadherence she cannot drive any earlier than 6 months.  I need to see that she can be adherent to the medication for 6 months.  She was last seen in April.  She could have resumed driving in late October 2023.  I do not know if she did or did not resume driving.  However now she has had another seizure in November.    ROS: Pertinent positive and negatives per HPI.  All others were reviewed and negative.         Medications:  Current Outpatient Medications   Medication Instructions    Clindamycin Phosphate 1 % External Gel 1 Application , Topical, 2 times daily    DROSPIRENONE-ETHINYL ESTRADIOL 3-0.02 MG Oral Tab 1 tablet, Oral, Daily    escitalopram (LEXAPRO) 20 mg, Oral, Daily    folic acid (FOLVITE) 1 mg, Oral, Daily    levetiracetam (KEPPRA) 750 mg, Oral, 2 times daily    naproxen (NAPROSYN) 500 mg, Oral, 2 times daily with meals    SPIRONOLACTONE 100 MG Oral Tab TAKE 1 TABLET BY MOUTH DAILY    SUMAtriptan (IMITREX) 50 mg, Oral, Every 2 hour PRN        Reviewed and assessed      Objective:    Last vitals and weight :  Body mass index is 23.3 kg/m².   Vitals:    09/20/24 0921   Patient Position: Sitting   BP Location: Right arm        Exam:  - General: appears stated age and no distress  - Pulmonary: Normal excursion of the chest.  No signs of respiratory distress.  Neurologic Exam    - Mental Status: Alert and attentive. .  Speech is spontaneous, fluent, and prosodic. Comprehension intact.  Repetition intact. Phrase length and rate are normal.  Fund of knowledge are good.  Recent and remote memory is intact.  Able to  provide history.     No paraphasic errors, anomia, acalculia, neglect, or R/L confusion.   - Cranial Nerves: No gaze preference. Visual fields: Patient reports he can see the examiner's entire face without any defects or missing segments.  Pupillary light reflex assessed by having the patient cover-uncover each eye.  Pupils are equally round and reactive to light  in a well lit room. EOMI. No nystagmus. No ptosis.  No bitemporal atrophy.  No masseter atrophy.  No jaw deviation.  Patient reports sensation in all 3 branches of the trigeminal is intact and symmetric.  No pathological facial asymmetry. No flattening of the nasolabial fold. .  Hearing grossly intact.  Tongue midline. No atrophy or fasiculations of the tongue noted. Palate and uvula elevate symmetrically.  Shoulder shrug symmetric.  Able to turn her head left and right.    - Motor:  normal tone/bulk.   Motor Strength:  moving all 4 extremities symmetrically and equally.    Pronator drift: No pronator drift   Arm Rolling: No orbiting.   Finger Taps: Finger taps are symmetric in rate and amplitude.    Foot Taps: Foot taps are symmetric.     Tremor/Abnormal movements: No tremor/abnormal movements appreciated.      - Sensory:   Light touch:  intact. Denies deficits.  - Cerebellum: No truncal ataxia. No titubations. No dysmetria, no dysdiadochokinesis. No overshoot.     - Gait/station: Normal gait and station. Symmetric arm swing.         Most recent lab results:   Reviewed and assessed  No results found for this or any previous visit (from the past 36 hour(s)).    Diagnostic studies:  Performed an independent visualization of   Imaging revealed:      Assessment     Perrynorma Oscar is 29 year old woman seen in follow up for her epilepsy. She had breakthrough seizures b/c she had multiple drinks in January 2023 and had missed doses in November 2023.  She had a seizure on 11/20/2023.  Prior to that she had breakthrough seizures because she had multiple  alcoholic beverages.  She has been seizure free since November 2023. She can resume driving. She wants to start a family. Will check keppra levels during her pregnancy.        Plan   1.  Epilepsy  She needs to take Keppra 750 twice daily.  Denies needing  a refill.  She needs to be on folic acid supplementation.  Ok to drive.  No climbing to heights.  No baths showers only.  1. Nonintractable epilepsy without status epilepticus, unspecified epilepsy type (HCC)  - levetiracetam 750 MG Oral Tab; Take 1 tablet (750 mg total) by mouth 2 (two) times daily.  Dispense: 60 tablet; Refill: 5  - Levetiracetam, S [E]  - folic acid 1 MG Oral Tab; Take 1 tablet (1 mg total) by mouth in the morning, at noon, and at bedtime for 7 days, THEN 1 tablet (1 mg total) daily.  Dispense: 141 tablet; Refill: 0                 This document is not intended to support charting by exception.  Sections left blank in a completed note should be presumed not to have been done.      Disclaimer:   This record was dictated using  Dragon software. There may be errors due to voice recognition problems that were not realized and corrected during the completion of the note.           Thank you for allowing me to participate in the care of your patient.    Phil Wagner DO  09/20/24  9:48 AM

## 2024-09-25 ENCOUNTER — TELEPHONE (OUTPATIENT)
Dept: NEUROLOGY | Facility: CLINIC | Age: 29
End: 2024-09-25

## 2024-09-25 NOTE — TELEPHONE ENCOUNTER
A request from [facility] has been received for a 90 day supply of patients Levetiracetam tabs 750 mg. Rx was signed and faxed back to [phone]. Rx sent to scanning

## 2025-04-05 ENCOUNTER — LAB ENCOUNTER (OUTPATIENT)
Dept: LAB | Age: 30
End: 2025-04-05
Attending: INTERNAL MEDICINE
Payer: COMMERCIAL

## 2025-04-05 ENCOUNTER — OFFICE VISIT (OUTPATIENT)
Age: 30
End: 2025-04-05
Payer: COMMERCIAL

## 2025-04-05 VITALS
BODY MASS INDEX: 24.66 KG/M2 | SYSTOLIC BLOOD PRESSURE: 100 MMHG | WEIGHT: 148 LBS | HEIGHT: 65 IN | HEART RATE: 68 BPM | DIASTOLIC BLOOD PRESSURE: 60 MMHG | OXYGEN SATURATION: 98 % | TEMPERATURE: 97 F

## 2025-04-05 DIAGNOSIS — Z31.69 INFERTILITY COUNSELING: ICD-10-CM

## 2025-04-05 DIAGNOSIS — Z00.00 ANNUAL PHYSICAL EXAM: Primary | ICD-10-CM

## 2025-04-05 DIAGNOSIS — Z12.4 ROUTINE PAPANICOLAOU SMEAR: ICD-10-CM

## 2025-04-05 DIAGNOSIS — G40.909 NONINTRACTABLE EPILEPSY WITHOUT STATUS EPILEPTICUS, UNSPECIFIED EPILEPSY TYPE (HCC): Primary | ICD-10-CM

## 2025-04-05 LAB
ALBUMIN SERPL-MCNC: 4.6 G/DL (ref 3.2–4.8)
ALBUMIN/GLOB SERPL: 1.8 {RATIO} (ref 1–2)
ALP LIVER SERPL-CCNC: 75 U/L
ALT SERPL-CCNC: 19 U/L
ANION GAP SERPL CALC-SCNC: 10 MMOL/L (ref 0–18)
AST SERPL-CCNC: 17 U/L (ref ?–34)
BASOPHILS # BLD AUTO: 0.04 X10(3) UL (ref 0–0.2)
BASOPHILS NFR BLD AUTO: 0.8 %
BILIRUB SERPL-MCNC: 0.3 MG/DL (ref 0.3–1.2)
BUN BLD-MCNC: 8 MG/DL (ref 9–23)
BUN/CREAT SERPL: 9.6 (ref 10–20)
CALCIUM BLD-MCNC: 9 MG/DL (ref 8.7–10.4)
CHLORIDE SERPL-SCNC: 104 MMOL/L (ref 98–112)
CHOLEST SERPL-MCNC: 213 MG/DL (ref ?–200)
CO2 SERPL-SCNC: 24 MMOL/L (ref 21–32)
CREAT BLD-MCNC: 0.83 MG/DL
DEPRECATED RDW RBC AUTO: 39.8 FL (ref 35.1–46.3)
EGFRCR SERPLBLD CKD-EPI 2021: 98 ML/MIN/1.73M2 (ref 60–?)
EOSINOPHIL # BLD AUTO: 0.06 X10(3) UL (ref 0–0.7)
EOSINOPHIL NFR BLD AUTO: 1.1 %
ERYTHROCYTE [DISTWIDTH] IN BLOOD BY AUTOMATED COUNT: 12.4 % (ref 11–15)
EST. AVERAGE GLUCOSE BLD GHB EST-MCNC: 97 MG/DL (ref 68–126)
FASTING PATIENT LIPID ANSWER: YES
FASTING STATUS PATIENT QL REPORTED: YES
GLOBULIN PLAS-MCNC: 2.6 G/DL (ref 2–3.5)
GLUCOSE BLD-MCNC: 93 MG/DL (ref 70–99)
HBA1C MFR BLD: 5 % (ref ?–5.7)
HCT VFR BLD AUTO: 39.1 %
HDLC SERPL-MCNC: 54 MG/DL (ref 40–59)
HGB BLD-MCNC: 13.6 G/DL
IMM GRANULOCYTES # BLD AUTO: 0.01 X10(3) UL (ref 0–1)
IMM GRANULOCYTES NFR BLD: 0.2 %
LDLC SERPL CALC-MCNC: 144 MG/DL (ref ?–100)
LYMPHOCYTES # BLD AUTO: 2.21 X10(3) UL (ref 1–4)
LYMPHOCYTES NFR BLD AUTO: 41.9 %
MCH RBC QN AUTO: 30.4 PG (ref 26–34)
MCHC RBC AUTO-ENTMCNC: 34.8 G/DL (ref 31–37)
MCV RBC AUTO: 87.3 FL
MONOCYTES # BLD AUTO: 0.46 X10(3) UL (ref 0.1–1)
MONOCYTES NFR BLD AUTO: 8.7 %
NEUTROPHILS # BLD AUTO: 2.49 X10 (3) UL (ref 1.5–7.7)
NEUTROPHILS # BLD AUTO: 2.49 X10(3) UL (ref 1.5–7.7)
NEUTROPHILS NFR BLD AUTO: 47.3 %
NONHDLC SERPL-MCNC: 159 MG/DL (ref ?–130)
OSMOLALITY SERPL CALC.SUM OF ELEC: 284 MOSM/KG (ref 275–295)
PLATELET # BLD AUTO: 248 10(3)UL (ref 150–450)
POTASSIUM SERPL-SCNC: 3.9 MMOL/L (ref 3.5–5.1)
PROT SERPL-MCNC: 7.2 G/DL (ref 5.7–8.2)
RBC # BLD AUTO: 4.48 X10(6)UL
SODIUM SERPL-SCNC: 138 MMOL/L (ref 136–145)
TRIGL SERPL-MCNC: 83 MG/DL (ref 30–149)
VLDLC SERPL CALC-MCNC: 15 MG/DL (ref 0–30)
WBC # BLD AUTO: 5.3 X10(3) UL (ref 4–11)

## 2025-04-05 PROCEDURE — 80177 DRUG SCRN QUAN LEVETIRACETAM: CPT

## 2025-04-05 PROCEDURE — 83036 HEMOGLOBIN GLYCOSYLATED A1C: CPT | Performed by: INTERNAL MEDICINE

## 2025-04-05 PROCEDURE — 85025 COMPLETE CBC W/AUTO DIFF WBC: CPT | Performed by: INTERNAL MEDICINE

## 2025-04-05 PROCEDURE — 80053 COMPREHEN METABOLIC PANEL: CPT | Performed by: INTERNAL MEDICINE

## 2025-04-05 PROCEDURE — 36415 COLL VENOUS BLD VENIPUNCTURE: CPT | Performed by: INTERNAL MEDICINE

## 2025-04-05 PROCEDURE — 99395 PREV VISIT EST AGE 18-39: CPT | Performed by: INTERNAL MEDICINE

## 2025-04-05 PROCEDURE — 80061 LIPID PANEL: CPT | Performed by: INTERNAL MEDICINE

## 2025-04-05 RX ORDER — SUMATRIPTAN 50 MG/1
50 TABLET, FILM COATED ORAL EVERY 2 HOUR PRN
Qty: 10 TABLET | Refills: 0 | Status: CANCELLED | OUTPATIENT
Start: 2025-04-05

## 2025-04-05 RX ORDER — FOLIC ACID 1 MG/1
1 TABLET ORAL DAILY
Qty: 90 TABLET | Refills: 1 | Status: SHIPPED | OUTPATIENT
Start: 2025-04-05

## 2025-04-09 LAB — LEVETIRACETAM LVL: 35.2 UG/ML
